# Patient Record
Sex: MALE | Race: WHITE | NOT HISPANIC OR LATINO | Employment: OTHER | URBAN - METROPOLITAN AREA
[De-identification: names, ages, dates, MRNs, and addresses within clinical notes are randomized per-mention and may not be internally consistent; named-entity substitution may affect disease eponyms.]

---

## 2023-02-10 ENCOUNTER — HOSPITAL ENCOUNTER (INPATIENT)
Facility: MEDICAL CENTER | Age: 83
LOS: 4 days | DRG: 557 | End: 2023-02-14
Attending: INTERNAL MEDICINE | Admitting: INTERNAL MEDICINE
Payer: MEDICARE

## 2023-02-10 DIAGNOSIS — I50.22 CHRONIC SYSTOLIC (CONGESTIVE) HEART FAILURE (HCC): ICD-10-CM

## 2023-02-10 DIAGNOSIS — K21.9 GASTROESOPHAGEAL REFLUX DISEASE WITHOUT ESOPHAGITIS: ICD-10-CM

## 2023-02-10 PROBLEM — R13.10 DYSPHAGIA: Status: ACTIVE | Noted: 2023-02-10

## 2023-02-10 PROBLEM — G93.40 ENCEPHALOPATHY: Status: ACTIVE | Noted: 2023-02-10

## 2023-02-10 PROBLEM — D72.829 LEUKOCYTOSIS: Status: ACTIVE | Noted: 2023-02-10

## 2023-02-10 PROBLEM — I21.4 NSTEMI (NON-ST ELEVATED MYOCARDIAL INFARCTION) (HCC): Status: ACTIVE | Noted: 2023-02-10

## 2023-02-10 PROBLEM — M62.82 RHABDOMYOLYSIS: Status: ACTIVE | Noted: 2023-02-10

## 2023-02-10 LAB
ALBUMIN SERPL BCP-MCNC: 3.9 G/DL (ref 3.2–4.9)
ALBUMIN/GLOB SERPL: 1.3 G/DL
ALP SERPL-CCNC: 152 U/L (ref 30–99)
ALT SERPL-CCNC: 95 U/L (ref 2–50)
ANION GAP SERPL CALC-SCNC: 14 MMOL/L (ref 7–16)
APPEARANCE UR: CLEAR
AST SERPL-CCNC: 227 U/L (ref 12–45)
BACTERIA #/AREA URNS HPF: ABNORMAL /HPF
BASOPHILS # BLD AUTO: 0.2 % (ref 0–1.8)
BASOPHILS # BLD: 0.03 K/UL (ref 0–0.12)
BILIRUB SERPL-MCNC: 3.1 MG/DL (ref 0.1–1.5)
BILIRUB UR QL STRIP.AUTO: NEGATIVE
BUN SERPL-MCNC: 39 MG/DL (ref 8–22)
CALCIUM ALBUM COR SERPL-MCNC: 9.8 MG/DL (ref 8.5–10.5)
CALCIUM SERPL-MCNC: 9.7 MG/DL (ref 8.5–10.5)
CHLORIDE SERPL-SCNC: 117 MMOL/L (ref 96–112)
CK SERPL-CCNC: 3736 U/L (ref 0–154)
CO2 SERPL-SCNC: 19 MMOL/L (ref 20–33)
COLOR UR: ABNORMAL
CREAT SERPL-MCNC: 0.88 MG/DL (ref 0.5–1.4)
D DIMER PPP IA.FEU-MCNC: 1.66 UG/ML (FEU) (ref 0–0.5)
EKG IMPRESSION: NORMAL
EKG IMPRESSION: NORMAL
EOSINOPHIL # BLD AUTO: 0 K/UL (ref 0–0.51)
EOSINOPHIL NFR BLD: 0 % (ref 0–6.9)
EPI CELLS #/AREA URNS HPF: NEGATIVE /HPF
ERYTHROCYTE [DISTWIDTH] IN BLOOD BY AUTOMATED COUNT: 57.6 FL (ref 35.9–50)
GFR SERPLBLD CREATININE-BSD FMLA CKD-EPI: 85 ML/MIN/1.73 M 2
GLOBULIN SER CALC-MCNC: 3 G/DL (ref 1.9–3.5)
GLUCOSE BLD STRIP.AUTO-MCNC: 157 MG/DL (ref 65–99)
GLUCOSE SERPL-MCNC: 179 MG/DL (ref 65–99)
GLUCOSE UR STRIP.AUTO-MCNC: >=1000 MG/DL
HCT VFR BLD AUTO: 36 % (ref 42–52)
HGB BLD-MCNC: 10.8 G/DL (ref 14–18)
HYALINE CASTS #/AREA URNS LPF: ABNORMAL /LPF
IMM GRANULOCYTES # BLD AUTO: 0.09 K/UL (ref 0–0.11)
IMM GRANULOCYTES NFR BLD AUTO: 0.5 % (ref 0–0.9)
INR PPP: 1.77 (ref 0.87–1.13)
KETONES UR STRIP.AUTO-MCNC: 15 MG/DL
LEUKOCYTE ESTERASE UR QL STRIP.AUTO: ABNORMAL
LYMPHOCYTES # BLD AUTO: 0.35 K/UL (ref 1–4.8)
LYMPHOCYTES NFR BLD: 2.1 % (ref 22–41)
MCH RBC QN AUTO: 23 PG (ref 27–33)
MCHC RBC AUTO-ENTMCNC: 30 G/DL (ref 33.7–35.3)
MCV RBC AUTO: 76.8 FL (ref 81.4–97.8)
MICRO URNS: ABNORMAL
MONOCYTES # BLD AUTO: 0.88 K/UL (ref 0–0.85)
MONOCYTES NFR BLD AUTO: 5.3 % (ref 0–13.4)
NEUTROPHILS # BLD AUTO: 15.15 K/UL (ref 1.82–7.42)
NEUTROPHILS NFR BLD: 91.9 % (ref 44–72)
NITRITE UR QL STRIP.AUTO: NEGATIVE
NRBC # BLD AUTO: 0 K/UL
NRBC BLD-RTO: 0 /100 WBC
PH UR STRIP.AUTO: 5 [PH] (ref 5–8)
PLATELET # BLD AUTO: 268 K/UL (ref 164–446)
PMV BLD AUTO: 9.5 FL (ref 9–12.9)
POTASSIUM SERPL-SCNC: 3.4 MMOL/L (ref 3.6–5.5)
PROCALCITONIN SERPL-MCNC: 2.59 NG/ML
PROT SERPL-MCNC: 6.9 G/DL (ref 6–8.2)
PROT UR QL STRIP: 300 MG/DL
PROTHROMBIN TIME: 20.2 SEC (ref 12–14.6)
RBC # BLD AUTO: 4.69 M/UL (ref 4.7–6.1)
RBC # URNS HPF: ABNORMAL /HPF
RBC UR QL AUTO: ABNORMAL
SODIUM SERPL-SCNC: 150 MMOL/L (ref 135–145)
SP GR UR STRIP.AUTO: 1.04
TROPONIN T SERPL-MCNC: 61 NG/L (ref 6–19)
TROPONIN T SERPL-MCNC: 71 NG/L (ref 6–19)
UROBILINOGEN UR STRIP.AUTO-MCNC: 1 MG/DL
WBC # BLD AUTO: 16.5 K/UL (ref 4.8–10.8)
WBC #/AREA URNS HPF: ABNORMAL /HPF

## 2023-02-10 PROCEDURE — 85379 FIBRIN DEGRADATION QUANT: CPT

## 2023-02-10 PROCEDURE — A9270 NON-COVERED ITEM OR SERVICE: HCPCS | Performed by: INTERNAL MEDICINE

## 2023-02-10 PROCEDURE — 82550 ASSAY OF CK (CPK): CPT

## 2023-02-10 PROCEDURE — 93010 ELECTROCARDIOGRAM REPORT: CPT | Performed by: INTERNAL MEDICINE

## 2023-02-10 PROCEDURE — 700102 HCHG RX REV CODE 250 W/ 637 OVERRIDE(OP): Performed by: INTERNAL MEDICINE

## 2023-02-10 PROCEDURE — 81001 URINALYSIS AUTO W/SCOPE: CPT

## 2023-02-10 PROCEDURE — 700105 HCHG RX REV CODE 258: Performed by: INTERNAL MEDICINE

## 2023-02-10 PROCEDURE — 85610 PROTHROMBIN TIME: CPT

## 2023-02-10 PROCEDURE — 80053 COMPREHEN METABOLIC PANEL: CPT

## 2023-02-10 PROCEDURE — 85025 COMPLETE CBC W/AUTO DIFF WBC: CPT

## 2023-02-10 PROCEDURE — 36415 COLL VENOUS BLD VENIPUNCTURE: CPT

## 2023-02-10 PROCEDURE — 770020 HCHG ROOM/CARE - TELE (206)

## 2023-02-10 PROCEDURE — 99223 1ST HOSP IP/OBS HIGH 75: CPT | Mod: AI | Performed by: INTERNAL MEDICINE

## 2023-02-10 PROCEDURE — 84484 ASSAY OF TROPONIN QUANT: CPT | Mod: 91

## 2023-02-10 PROCEDURE — 82962 GLUCOSE BLOOD TEST: CPT

## 2023-02-10 PROCEDURE — 93005 ELECTROCARDIOGRAM TRACING: CPT | Performed by: INTERNAL MEDICINE

## 2023-02-10 PROCEDURE — 700111 HCHG RX REV CODE 636 W/ 250 OVERRIDE (IP): Performed by: INTERNAL MEDICINE

## 2023-02-10 PROCEDURE — 84145 PROCALCITONIN (PCT): CPT

## 2023-02-10 RX ORDER — MORPHINE SULFATE 4 MG/ML
2 INJECTION INTRAVENOUS
Status: DISCONTINUED | OUTPATIENT
Start: 2023-02-10 | End: 2023-02-11

## 2023-02-10 RX ORDER — LORAZEPAM 2 MG/ML
0.5 INJECTION INTRAMUSCULAR EVERY 6 HOURS PRN
Status: DISCONTINUED | OUTPATIENT
Start: 2023-02-10 | End: 2023-02-14 | Stop reason: HOSPADM

## 2023-02-10 RX ORDER — SODIUM CHLORIDE 9 MG/ML
INJECTION, SOLUTION INTRAVENOUS CONTINUOUS
Status: DISCONTINUED | OUTPATIENT
Start: 2023-02-10 | End: 2023-02-10

## 2023-02-10 RX ORDER — GUAIFENESIN/DEXTROMETHORPHAN 100-10MG/5
10 SYRUP ORAL EVERY 6 HOURS PRN
Status: DISCONTINUED | OUTPATIENT
Start: 2023-02-10 | End: 2023-02-14 | Stop reason: HOSPADM

## 2023-02-10 RX ORDER — BISACODYL 10 MG
10 SUPPOSITORY, RECTAL RECTAL
Status: DISCONTINUED | OUTPATIENT
Start: 2023-02-10 | End: 2023-02-14 | Stop reason: HOSPADM

## 2023-02-10 RX ORDER — POLYETHYLENE GLYCOL 3350 17 G/17G
1 POWDER, FOR SOLUTION ORAL
Status: DISCONTINUED | OUTPATIENT
Start: 2023-02-10 | End: 2023-02-14 | Stop reason: HOSPADM

## 2023-02-10 RX ORDER — LORAZEPAM 1 MG/1
1 TABLET ORAL EVERY 6 HOURS PRN
Status: DISCONTINUED | OUTPATIENT
Start: 2023-02-10 | End: 2023-02-14 | Stop reason: HOSPADM

## 2023-02-10 RX ORDER — WARFARIN SODIUM 5 MG/1
5 TABLET ORAL
Status: COMPLETED | OUTPATIENT
Start: 2023-02-10 | End: 2023-02-10

## 2023-02-10 RX ORDER — ONDANSETRON 4 MG/1
4 TABLET, ORALLY DISINTEGRATING ORAL EVERY 4 HOURS PRN
Status: DISCONTINUED | OUTPATIENT
Start: 2023-02-10 | End: 2023-02-10

## 2023-02-10 RX ORDER — HEPARIN SODIUM 5000 [USP'U]/ML
5000 INJECTION, SOLUTION INTRAVENOUS; SUBCUTANEOUS EVERY 8 HOURS
Status: DISCONTINUED | OUTPATIENT
Start: 2023-02-10 | End: 2023-02-10

## 2023-02-10 RX ORDER — OXYCODONE HYDROCHLORIDE 5 MG/1
5 TABLET ORAL
Status: DISCONTINUED | OUTPATIENT
Start: 2023-02-10 | End: 2023-02-11

## 2023-02-10 RX ORDER — AMOXICILLIN 250 MG
2 CAPSULE ORAL 2 TIMES DAILY
Status: DISCONTINUED | OUTPATIENT
Start: 2023-02-10 | End: 2023-02-14 | Stop reason: HOSPADM

## 2023-02-10 RX ORDER — SODIUM CHLORIDE, SODIUM LACTATE, POTASSIUM CHLORIDE, CALCIUM CHLORIDE 600; 310; 30; 20 MG/100ML; MG/100ML; MG/100ML; MG/100ML
INJECTION, SOLUTION INTRAVENOUS CONTINUOUS
Status: DISCONTINUED | OUTPATIENT
Start: 2023-02-10 | End: 2023-02-14

## 2023-02-10 RX ORDER — ONDANSETRON 2 MG/ML
4 INJECTION INTRAMUSCULAR; INTRAVENOUS EVERY 4 HOURS PRN
Status: DISCONTINUED | OUTPATIENT
Start: 2023-02-10 | End: 2023-02-10

## 2023-02-10 RX ORDER — OXYCODONE HYDROCHLORIDE 5 MG/1
2.5 TABLET ORAL
Status: DISCONTINUED | OUTPATIENT
Start: 2023-02-10 | End: 2023-02-14 | Stop reason: HOSPADM

## 2023-02-10 RX ORDER — HYDRALAZINE HYDROCHLORIDE 20 MG/ML
10 INJECTION INTRAMUSCULAR; INTRAVENOUS EVERY 4 HOURS PRN
Status: DISCONTINUED | OUTPATIENT
Start: 2023-02-10 | End: 2023-02-14 | Stop reason: HOSPADM

## 2023-02-10 RX ORDER — ACETAMINOPHEN 325 MG/1
650 TABLET ORAL EVERY 6 HOURS PRN
Status: DISCONTINUED | OUTPATIENT
Start: 2023-02-10 | End: 2023-02-14 | Stop reason: HOSPADM

## 2023-02-10 RX ADMIN — CEFTRIAXONE SODIUM 2000 MG: 10 INJECTION, POWDER, FOR SOLUTION INTRAVENOUS at 18:30

## 2023-02-10 RX ADMIN — SODIUM CHLORIDE, POTASSIUM CHLORIDE, SODIUM LACTATE AND CALCIUM CHLORIDE: 600; 310; 30; 20 INJECTION, SOLUTION INTRAVENOUS at 23:46

## 2023-02-10 RX ADMIN — SODIUM CHLORIDE, POTASSIUM CHLORIDE, SODIUM LACTATE AND CALCIUM CHLORIDE: 600; 310; 30; 20 INJECTION, SOLUTION INTRAVENOUS at 17:55

## 2023-02-10 RX ADMIN — WARFARIN SODIUM 5 MG: 5 TABLET ORAL at 21:15

## 2023-02-10 ASSESSMENT — ENCOUNTER SYMPTOMS
DIARRHEA: 0
DIZZINESS: 0
PALPITATIONS: 0
ABDOMINAL PAIN: 0
FEVER: 0
NERVOUS/ANXIOUS: 0
SPEECH CHANGE: 0
BACK PAIN: 0
NAUSEA: 0
WEAKNESS: 1
COUGH: 0
HEADACHES: 0
FOCAL WEAKNESS: 0
FALLS: 1
VOMITING: 0
CHILLS: 0
CONSTIPATION: 0
MEMORY LOSS: 1
DEPRESSION: 0
DIAPHORESIS: 0
SENSORY CHANGE: 0
FLANK PAIN: 0
SHORTNESS OF BREATH: 0
MYALGIAS: 1

## 2023-02-10 ASSESSMENT — LIFESTYLE VARIABLES
TOTAL SCORE: 0
ON A TYPICAL DAY WHEN YOU DRINK ALCOHOL HOW MANY DRINKS DO YOU HAVE: 0
TOTAL SCORE: 0
HOW MANY TIMES IN THE PAST YEAR HAVE YOU HAD 5 OR MORE DRINKS IN A DAY: 0
AVERAGE NUMBER OF DAYS PER WEEK YOU HAVE A DRINK CONTAINING ALCOHOL: 0
EVER FELT BAD OR GUILTY ABOUT YOUR DRINKING: NO
EVER HAD A DRINK FIRST THING IN THE MORNING TO STEADY YOUR NERVES TO GET RID OF A HANGOVER: NO
CONSUMPTION TOTAL: NEGATIVE
HAVE PEOPLE ANNOYED YOU BY CRITICIZING YOUR DRINKING: NO
TOTAL SCORE: 0
HAVE YOU EVER FELT YOU SHOULD CUT DOWN ON YOUR DRINKING: NO
ALCOHOL_USE: NO

## 2023-02-10 ASSESSMENT — COGNITIVE AND FUNCTIONAL STATUS - GENERAL
MOBILITY SCORE: 12
TURNING FROM BACK TO SIDE WHILE IN FLAT BAD: A LOT
STANDING UP FROM CHAIR USING ARMS: A LOT
SUGGESTED CMS G CODE MODIFIER MOBILITY: CL
TOILETING: A LOT
SUGGESTED CMS G CODE MODIFIER DAILY ACTIVITY: CL
PERSONAL GROOMING: A LOT
MOVING FROM LYING ON BACK TO SITTING ON SIDE OF FLAT BED: A LOT
DRESSING REGULAR LOWER BODY CLOTHING: A LOT
MOVING TO AND FROM BED TO CHAIR: A LOT
DRESSING REGULAR UPPER BODY CLOTHING: A LOT
DAILY ACTIVITIY SCORE: 12
EATING MEALS: A LOT
WALKING IN HOSPITAL ROOM: A LOT
HELP NEEDED FOR BATHING: A LOT
CLIMB 3 TO 5 STEPS WITH RAILING: A LOT

## 2023-02-10 ASSESSMENT — PAIN DESCRIPTION - PAIN TYPE: TYPE: ACUTE PAIN

## 2023-02-10 ASSESSMENT — PATIENT HEALTH QUESTIONNAIRE - PHQ9
2. FEELING DOWN, DEPRESSED, IRRITABLE, OR HOPELESS: NOT AT ALL
1. LITTLE INTEREST OR PLEASURE IN DOING THINGS: NOT AT ALL
SUM OF ALL RESPONSES TO PHQ9 QUESTIONS 1 AND 2: 0

## 2023-02-10 NOTE — PROGRESS NOTES
Triage Note:    82 male evaluated for encephalopathy, patient was found down.  He is has a history of atrial fibrillation on chronic anticoagulation with Coumadin.  INR 1.9.  He is transferred for rhabdomyolysis, NSTEMI with troponin of 316.  Patient denies chest pain.    Patient is transferred for NSTEMI and possible cardiac evaluation.

## 2023-02-11 ENCOUNTER — APPOINTMENT (OUTPATIENT)
Dept: RADIOLOGY | Facility: MEDICAL CENTER | Age: 83
DRG: 557 | End: 2023-02-11
Attending: INTERNAL MEDICINE
Payer: MEDICARE

## 2023-02-11 ENCOUNTER — APPOINTMENT (OUTPATIENT)
Dept: CARDIOLOGY | Facility: MEDICAL CENTER | Age: 83
DRG: 557 | End: 2023-02-11
Attending: INTERNAL MEDICINE
Payer: MEDICARE

## 2023-02-11 PROBLEM — I21.4 NSTEMI (NON-ST ELEVATED MYOCARDIAL INFARCTION) (HCC): Status: RESOLVED | Noted: 2023-02-10 | Resolved: 2023-02-11

## 2023-02-11 LAB
ALBUMIN SERPL BCP-MCNC: 3.7 G/DL (ref 3.2–4.9)
ANION GAP SERPL CALC-SCNC: 14 MMOL/L (ref 7–16)
ANISOCYTOSIS BLD QL SMEAR: ABNORMAL
BASOPHILS # BLD AUTO: 0.1 % (ref 0–1.8)
BASOPHILS # BLD: 0.02 K/UL (ref 0–0.12)
BUN SERPL-MCNC: 36 MG/DL (ref 8–22)
BUN SERPL-MCNC: 38 MG/DL (ref 8–22)
CALCIUM ALBUM COR SERPL-MCNC: 9.9 MG/DL (ref 8.5–10.5)
CALCIUM SERPL-MCNC: 9.4 MG/DL (ref 8.5–10.5)
CALCIUM SERPL-MCNC: 9.7 MG/DL (ref 8.5–10.5)
CHLORIDE SERPL-SCNC: 113 MMOL/L (ref 96–112)
CHLORIDE SERPL-SCNC: 118 MMOL/L (ref 96–112)
CHOLEST SERPL-MCNC: 151 MG/DL (ref 100–199)
CK SERPL-CCNC: 2243 U/L (ref 0–154)
CO2 SERPL-SCNC: 18 MMOL/L (ref 20–33)
CO2 SERPL-SCNC: 21 MMOL/L (ref 20–33)
COMMENT 1642: NORMAL
CREAT SERPL-MCNC: 0.72 MG/DL (ref 0.5–1.4)
CREAT SERPL-MCNC: 0.74 MG/DL (ref 0.5–1.4)
EKG IMPRESSION: NORMAL
EOSINOPHIL # BLD AUTO: 0 K/UL (ref 0–0.51)
EOSINOPHIL NFR BLD: 0 % (ref 0–6.9)
ERYTHROCYTE [DISTWIDTH] IN BLOOD BY AUTOMATED COUNT: 59.2 FL (ref 35.9–50)
GFR SERPLBLD CREATININE-BSD FMLA CKD-EPI: 89 ML/MIN/1.73 M 2
GFR SERPLBLD CREATININE-BSD FMLA CKD-EPI: 90 ML/MIN/1.73 M 2
GLUCOSE SERPL-MCNC: 152 MG/DL (ref 65–99)
GLUCOSE SERPL-MCNC: 221 MG/DL (ref 65–99)
HCT VFR BLD AUTO: 36.8 % (ref 42–52)
HDLC SERPL-MCNC: 58 MG/DL
HGB BLD-MCNC: 10.8 G/DL (ref 14–18)
HYPOCHROMIA BLD QL SMEAR: ABNORMAL
IMM GRANULOCYTES # BLD AUTO: 0.15 K/UL (ref 0–0.11)
IMM GRANULOCYTES NFR BLD AUTO: 0.9 % (ref 0–0.9)
INR PPP: 1.8 (ref 0.87–1.13)
LDLC SERPL CALC-MCNC: 62 MG/DL
LV EJECT FRACT  99904: 45
LV EJECT FRACT MOD 2C 99903: 49.69
LV EJECT FRACT MOD 4C 99902: 44.88
LV EJECT FRACT MOD BP 99901: 46.25
LYMPHOCYTES # BLD AUTO: 0.38 K/UL (ref 1–4.8)
LYMPHOCYTES NFR BLD: 2.2 % (ref 22–41)
MCH RBC QN AUTO: 23 PG (ref 27–33)
MCHC RBC AUTO-ENTMCNC: 29.3 G/DL (ref 33.7–35.3)
MCV RBC AUTO: 78.5 FL (ref 81.4–97.8)
MICROCYTES BLD QL SMEAR: ABNORMAL
MONOCYTES # BLD AUTO: 0.9 K/UL (ref 0–0.85)
MONOCYTES NFR BLD AUTO: 5.2 % (ref 0–13.4)
MORPHOLOGY BLD-IMP: NORMAL
NEUTROPHILS # BLD AUTO: 15.79 K/UL (ref 1.82–7.42)
NEUTROPHILS NFR BLD: 91.6 % (ref 44–72)
NRBC # BLD AUTO: 0 K/UL
NRBC BLD-RTO: 0 /100 WBC
OVALOCYTES BLD QL SMEAR: NORMAL
PHOSPHATE SERPL-MCNC: 2.8 MG/DL (ref 2.5–4.5)
PLATELET # BLD AUTO: 279 K/UL (ref 164–446)
PLATELET BLD QL SMEAR: NORMAL
PMV BLD AUTO: 9.8 FL (ref 9–12.9)
POIKILOCYTOSIS BLD QL SMEAR: NORMAL
POTASSIUM SERPL-SCNC: 3.4 MMOL/L (ref 3.6–5.5)
POTASSIUM SERPL-SCNC: 3.5 MMOL/L (ref 3.6–5.5)
PROTHROMBIN TIME: 20.4 SEC (ref 12–14.6)
RBC # BLD AUTO: 4.69 M/UL (ref 4.7–6.1)
RBC BLD AUTO: PRESENT
SODIUM SERPL-SCNC: 146 MMOL/L (ref 135–145)
SODIUM SERPL-SCNC: 150 MMOL/L (ref 135–145)
TRIGL SERPL-MCNC: 155 MG/DL (ref 0–149)
WBC # BLD AUTO: 17.2 K/UL (ref 4.8–10.8)

## 2023-02-11 PROCEDURE — 80061 LIPID PANEL: CPT

## 2023-02-11 PROCEDURE — A9270 NON-COVERED ITEM OR SERVICE: HCPCS | Performed by: INTERNAL MEDICINE

## 2023-02-11 PROCEDURE — 700105 HCHG RX REV CODE 258: Performed by: INTERNAL MEDICINE

## 2023-02-11 PROCEDURE — 93306 TTE W/DOPPLER COMPLETE: CPT | Mod: 26 | Performed by: INTERNAL MEDICINE

## 2023-02-11 PROCEDURE — 85610 PROTHROMBIN TIME: CPT

## 2023-02-11 PROCEDURE — 36415 COLL VENOUS BLD VENIPUNCTURE: CPT

## 2023-02-11 PROCEDURE — 700102 HCHG RX REV CODE 250 W/ 637 OVERRIDE(OP): Performed by: INTERNAL MEDICINE

## 2023-02-11 PROCEDURE — 770020 HCHG ROOM/CARE - TELE (206)

## 2023-02-11 PROCEDURE — 93306 TTE W/DOPPLER COMPLETE: CPT

## 2023-02-11 PROCEDURE — 80048 BASIC METABOLIC PNL TOTAL CA: CPT

## 2023-02-11 PROCEDURE — 80069 RENAL FUNCTION PANEL: CPT

## 2023-02-11 PROCEDURE — 85025 COMPLETE CBC W/AUTO DIFF WBC: CPT

## 2023-02-11 PROCEDURE — 92610 EVALUATE SWALLOWING FUNCTION: CPT

## 2023-02-11 PROCEDURE — 99232 SBSQ HOSP IP/OBS MODERATE 35: CPT | Performed by: INTERNAL MEDICINE

## 2023-02-11 PROCEDURE — 70450 CT HEAD/BRAIN W/O DYE: CPT

## 2023-02-11 PROCEDURE — 82550 ASSAY OF CK (CPK): CPT

## 2023-02-11 PROCEDURE — 93010 ELECTROCARDIOGRAM REPORT: CPT | Performed by: INTERNAL MEDICINE

## 2023-02-11 RX ORDER — TAMSULOSIN HYDROCHLORIDE 0.4 MG/1
0.4 CAPSULE ORAL DAILY
COMMUNITY

## 2023-02-11 RX ORDER — SIMVASTATIN 20 MG
20 TABLET ORAL EVERY EVENING
Status: DISCONTINUED | OUTPATIENT
Start: 2023-02-11 | End: 2023-02-14 | Stop reason: HOSPADM

## 2023-02-11 RX ORDER — SPIRONOLACTONE 25 MG/1
25 TABLET ORAL DAILY
COMMUNITY

## 2023-02-11 RX ORDER — SPIRONOLACTONE 25 MG/1
25 TABLET ORAL DAILY
Status: DISCONTINUED | OUTPATIENT
Start: 2023-02-12 | End: 2023-02-14 | Stop reason: HOSPADM

## 2023-02-11 RX ORDER — GLIPIZIDE 5 MG/1
5 TABLET, FILM COATED, EXTENDED RELEASE ORAL DAILY
COMMUNITY

## 2023-02-11 RX ORDER — LISINOPRIL 20 MG/1
40 TABLET ORAL DAILY
Status: DISCONTINUED | OUTPATIENT
Start: 2023-02-12 | End: 2023-02-14 | Stop reason: HOSPADM

## 2023-02-11 RX ORDER — LISINOPRIL 40 MG/1
40 TABLET ORAL DAILY
Status: ON HOLD | COMMUNITY
Start: 2022-02-11 | End: 2023-02-11

## 2023-02-11 RX ORDER — TAMSULOSIN HYDROCHLORIDE 0.4 MG/1
0.4 CAPSULE ORAL DAILY
Status: DISCONTINUED | OUTPATIENT
Start: 2023-02-11 | End: 2023-02-14 | Stop reason: HOSPADM

## 2023-02-11 RX ORDER — FUROSEMIDE 20 MG/1
20 TABLET ORAL DAILY
Status: DISCONTINUED | OUTPATIENT
Start: 2023-02-12 | End: 2023-02-14 | Stop reason: HOSPADM

## 2023-02-11 RX ORDER — SIMVASTATIN 20 MG
20 TABLET ORAL
Status: ON HOLD | COMMUNITY
Start: 2022-02-11 | End: 2023-02-11

## 2023-02-11 RX ORDER — FUROSEMIDE 20 MG/1
20 TABLET ORAL DAILY
COMMUNITY

## 2023-02-11 RX ORDER — DAPAGLIFLOZIN 10 MG/1
10 TABLET, FILM COATED ORAL DAILY
Status: DISCONTINUED | OUTPATIENT
Start: 2023-02-11 | End: 2023-02-14 | Stop reason: HOSPADM

## 2023-02-11 RX ORDER — ATENOLOL 50 MG/1
50 TABLET ORAL DAILY
Status: DISCONTINUED | OUTPATIENT
Start: 2023-02-11 | End: 2023-02-13

## 2023-02-11 RX ORDER — WARFARIN SODIUM 2.5 MG/1
2.5 TABLET ORAL
Status: DISCONTINUED | OUTPATIENT
Start: 2023-02-11 | End: 2023-02-13

## 2023-02-11 RX ORDER — ATENOLOL 50 MG/1
50 TABLET ORAL DAILY
Status: ON HOLD | COMMUNITY
End: 2023-02-11

## 2023-02-11 RX ORDER — WARFARIN SODIUM 1 MG/1
1 TABLET ORAL DAILY
COMMUNITY

## 2023-02-11 RX ORDER — WARFARIN SODIUM 5 MG/1
5 TABLET ORAL
Status: DISCONTINUED | OUTPATIENT
Start: 2023-02-14 | End: 2023-02-13

## 2023-02-11 RX ORDER — DAPAGLIFLOZIN 10 MG/1
10 TABLET, FILM COATED ORAL DAILY
COMMUNITY
Start: 2023-01-16

## 2023-02-11 RX ORDER — OMEPRAZOLE 20 MG/1
20 CAPSULE, DELAYED RELEASE ORAL DAILY
Status: DISCONTINUED | OUTPATIENT
Start: 2023-02-11 | End: 2023-02-14 | Stop reason: HOSPADM

## 2023-02-11 RX ADMIN — OMEPRAZOLE 20 MG: 20 CAPSULE, DELAYED RELEASE ORAL at 15:35

## 2023-02-11 RX ADMIN — ATENOLOL 50 MG: 50 TABLET ORAL at 15:35

## 2023-02-11 RX ADMIN — SODIUM CHLORIDE, POTASSIUM CHLORIDE, SODIUM LACTATE AND CALCIUM CHLORIDE: 600; 310; 30; 20 INJECTION, SOLUTION INTRAVENOUS at 05:17

## 2023-02-11 RX ADMIN — SENNOSIDES AND DOCUSATE SODIUM 2 TABLET: 50; 8.6 TABLET ORAL at 05:06

## 2023-02-11 RX ADMIN — DAPAGLIFLOZIN 10 MG: 10 TABLET, FILM COATED ORAL at 15:35

## 2023-02-11 RX ADMIN — TAMSULOSIN HYDROCHLORIDE 0.4 MG: 0.4 CAPSULE ORAL at 15:35

## 2023-02-11 RX ADMIN — SIMVASTATIN 20 MG: 20 TABLET, FILM COATED ORAL at 17:39

## 2023-02-11 RX ADMIN — SODIUM CHLORIDE, POTASSIUM CHLORIDE, SODIUM LACTATE AND CALCIUM CHLORIDE: 600; 310; 30; 20 INJECTION, SOLUTION INTRAVENOUS at 16:27

## 2023-02-11 RX ADMIN — ASPIRIN 81 MG: 81 TABLET, COATED ORAL at 05:06

## 2023-02-11 ASSESSMENT — COPD QUESTIONNAIRES
HAVE YOU SMOKED AT LEAST 100 CIGARETTES IN YOUR ENTIRE LIFE: NO/DON'T KNOW
DO YOU EVER COUGH UP ANY MUCUS OR PHLEGM?: NO/ONLY WITH OCCASIONAL COLDS OR INFECTIONS
DURING THE PAST 4 WEEKS HOW MUCH DID YOU FEEL SHORT OF BREATH: NONE/LITTLE OF THE TIME
COPD SCREENING SCORE: 2

## 2023-02-11 ASSESSMENT — CHA2DS2 SCORE
CHF OR LEFT VENTRICULAR DYSFUNCTION: NO
SEX: MALE
CHA2DS2 VASC SCORE: 2
PRIOR STROKE OR TIA OR THROMBOEMBOLISM: NO
AGE 75 OR GREATER: YES
VASCULAR DISEASE: NO
DIABETES: NO
HYPERTENSION: NO
AGE 65 TO 74: NO

## 2023-02-11 ASSESSMENT — FIBROSIS 4 INDEX: FIB4 SCORE: 6.93

## 2023-02-11 NOTE — CARE PLAN
The patient is Stable - Low risk of patient condition declining or worsening    Shift Goals  Clinical Goals: Remain free from chest pain, remain free from falls, remain free from aspirations, remain oriented x 4  Patient Goals: Eat dinner  Family Goals: ABE      Problem: Hemodynamics  Goal: Patient's hemodynamics, fluid balance and neurologic status will be stable or improve  Outcome: Progressing     Problem: Fluid Volume  Goal: Fluid volume balance will be maintained  Outcome: Progressing     Problem: Dysphagia  Goal: Dysphagia will improve  Outcome: Progressing     Problem: Urinary Elimination  Goal: Establish and maintain regular urinary output  Outcome: Progressing       Progress made toward(s) clinical / shift goals:  Pt denies chest pain, EKGs unremarkable from prior, VSS. Pt remains oriented x 3-4, occasionally confused about location. Possible hallucinations noted. Occasionally attempts to get out of bed. Appropriate fall precautions in place, education provided and reinforced. Med administered crushed with thick liquid, pt tolerated well with no coughing. Speech eval pending. HOB elevated, education provided.     Patient is not progressing towards the following goals: N/A

## 2023-02-11 NOTE — ASSESSMENT & PLAN NOTE
"Patient will be admitted to the telemetry telemetry inpatient unit  CPK of 3900  Continue IV fluid hydration  Patient was found down  He does report hitting his head and loss of consciousness, no focal deficits  -Continue neurochecks  Consider further neurologic work-up if worsening neurologic symptoms  PT/OT/SLP evaluation  Monitor renal function\"    Gentle IV hydration  1-2 glasses free water per meal  Restart diuretics and spironolactone  Trend Na.  Na normalized. Advised to continue drinking pure water  "

## 2023-02-11 NOTE — PROGRESS NOTES
Inpatient Anticoagulation Service Note for 2/10/2023      Reason for Anticoagulation: Atrial Fibrillation           Hemoglobin Value: (!) 10.8  Hematocrit Value: (!) 36  Lab Platelet Value: 268  Target INR: 2.0 to 3.0    INR from last 7 days       Date/Time INR Value    02/10/23 1759 1.77          Dose from last 7 days       Date/Time Dose (mg)    02/10/23 2003 5          Significant Interactions: Antiplatelet Medications, Antibiotics  Bridge Therapy: No     Reversal Agent Administered: Not Applicable  Comments: Patient presents as a transfer after being found down with acute rhabdomyolysis and altered mental status. He is unable to confirm his dosing history. Chart review from [facility] indicates that he takes 5mg twice weekly and 2.5mg the rest of the week. INR is subtherapeutic, being treated for afib. Will give 5mg dose tonight and check INR in the morning. No bleeding noted.    Plan:  5mg   Education Material Provided?: No    Pharmacist suggested discharge dosinmg Tuesday and Thursday with 2.5mg the rest of the week. INR within 72 hours of discharge.      Tru Trejo, PharmD

## 2023-02-11 NOTE — PROGRESS NOTES
"Hospital Medicine Daily Progress Note    Date of Service  2/11/2023    Chief Complaint  Perry Cross is a 82 y.o. male admitted 2/10/2023 with No chief complaint on file.        Hospital Course  No notes on file  Perry Cross is a 82 y.o. male who presented 2/10/2023 with history of atrial fibrillation on chronic anticoagulation with Coumadin presents after being found down at his home.  Patient is normally functional and independent.  He lives alone.  Patient was found to have greater than 3000 CPK with elevated troponin at outlying facility PE.  EKG with atrial fibrillation, rate controlled.  INR of 1.9.  Patient does report hitting his head, however, denies  loss of consciousness.  Urinalysis is pending.     On evaluation, patient was unable to pass a swallow evaluation.  He reports generalized body aches.  He is quite pleasant, and reports falling down the stairs.  He is unclear how the ambulance was contacted.  But doesn't believe he was on the ground for long.  Pt is alert and oriented x 3 on my exam.  However, as per staff, intermittent confusion.  Was unable to recognize his family.  [facility].\"    Dr. Ricketts 2/10/2023    Interval Problem Update  2/11. He is back to his baseline though he has poor insight and memory, he is in a good mood. Family at [facility], I spent time answering their questions reviewing results, medications and tests. Patient also on a dysphagia friendly diet  2/11/2023 Time spent [id number]    I have discussed this patient's plan of care and discharge plan at IDT rounds today with Case Management, Nursing, Nursing leadership, and other members of the IDT team.    Consultants/Specialty      Code Status  Full Code    Disposition  Patient is not medically cleared for discharge.   Anticipate discharge to  TBD .  I have placed the appropriate orders for post-discharge needs.    Review of Systems  Review of Systems   Unable to perform ROS: " Mental acuity      Physical Exam  Temp:  [36.6 °C (97.9 °F)-37.1 °C (98.8 °F)] 36.6 °C (97.9 °F)  Pulse:  [61-88] 74  Resp:  [16-18] 17  BP: (135-165)/(66-96) 135/80  SpO2:  [92 %-96 %] 95 %    Physical Exam  Vitals and nursing note reviewed.   Constitutional:       Comments: Elderly, thin   HENT:      Head: Normocephalic and atraumatic.      Right Ear: External ear normal.      Left Ear: External ear normal.      Nose: Nose normal.      Mouth/Throat:      Mouth: Mucous membranes are moist.   Eyes:      General: No scleral icterus.     Conjunctiva/sclera: Conjunctivae normal.   Cardiovascular:      Rate and Rhythm: Normal rate and regular rhythm.      Heart sounds: No murmur heard.    No friction rub. No gallop.   Pulmonary:      Effort: Pulmonary effort is normal.      Breath sounds: Normal breath sounds.   Abdominal:      General: Abdomen is flat. Bowel sounds are normal. There is no distension.      Palpations: Abdomen is soft.      Tenderness: There is no abdominal tenderness. There is no guarding.   Musculoskeletal:         General: Normal range of motion.      Cervical back: Normal range of motion and neck supple.   Skin:     General: Skin is warm.   Neurological:      Mental Status: He is alert and oriented to person, place, and time. Mental status is at baseline.      Comments: Memorry issues  Cognitive deficits   Psychiatric:         Mood and Affect: Mood normal.         Behavior: Behavior normal.         Thought Content: Thought content normal.         Judgment: Judgment normal.       Fluids    Intake/Output Summary (Last 24 hours) at 2/11/2023 0808  Last data filed at 2/11/2023 0500  Gross per 24 hour   Intake 1963.58 ml   Output 1000 ml   Net 963.58 ml       Laboratory  Recent Labs     02/10/23  1438 02/11/23  0514   WBC 16.5* 17.2*   RBC 4.69* 4.69*   HEMOGLOBIN 10.8* 10.8*   HEMATOCRIT 36.0* 36.8*   MCV 76.8* 78.5*   MCH 23.0* 23.0*   MCHC 30.0* 29.3*   RDW 57.6* 59.2*   PLATELETCT 268 279   MPV 9.5  "9.8     Recent Labs     02/10/23  1438 02/11/23  0514   SODIUM 150* 150*   POTASSIUM 3.4* 3.5*   CHLORIDE 117* 118*   CO2 19* 18*   GLUCOSE 179* 152*   BUN 39* 38*   CREATININE 0.88 0.72   CALCIUM 9.7 9.4     Recent Labs     02/10/23  1759 02/11/23  0514   INR 1.77* 1.80*         Recent Labs     02/11/23  0514   TRIGLYCERIDE 155*   HDL 58   LDL 62       Imaging  EC-ECHOCARDIOGRAM COMPLETE W/O CONT   Final Result      NM-CARDIAC STRESS TEST    (Results Pending)        Assessment/Plan  * Rhabdomyolysis, hypernatremia/dehydration, UTI, elevated troponin, h/o mild CHF and mod pul HTN  Assessment & Plan  Patient will be admitted to the telemetry telemetry inpatient unit  CPK of 3900  Continue IV fluid hydration  Patient was found down  He does report hitting his head and loss of consciousness, no focal deficits  -Continue neurochecks  Consider further neurologic work-up if worsening neurologic symptoms  PT/OT/SLP evaluation  Monitor renal function\"    Gentle IV hydration  Close to euvolemia, will need diuretics at some point  Note sodium levels, allow free    Encephalopathy  Assessment & Plan  Patient with intermittent confusion, lives alone and independent prior to this episode  Continue neurochecks  Geriatrics consult  Rule out infection  UA pending\"    Seems to be back to baseline  U/A show evidence of UTI thus Abx  Reviewed head CT results with family-normal    Leukocytosis  Assessment & Plan  Leukocytosis, UA pending  We will empirically cover with Rocephin  Follow-up procalcitonin\"    U/A some evidence if UTI  Continue antibiotics    Dysphagia  Assessment & Plan  Failed swallow evaluation  N.p.o. for SLP evaluation  Continue IV fluids\"    Now on pureed liquids but if tolerating do clear liquid         VTE prophylaxis: therapeutic anticoagulation with warfarin    I have performed a physical exam and reviewed and updated ROS and Plan today (2/11/2023). In review of yesterday's note (2/10/2023), there are no changes " except as documented above.

## 2023-02-11 NOTE — PROGRESS NOTES
Inpatient Anticoagulation Service Note for 2/11/2023      Reason for Anticoagulation: Atrial Fibrillation   [id number] VASc Score: 2  HAS-BLED Score: 2    Hemoglobin Value: (!) 10.8  Hematocrit Value: (!) 36.8  Lab Platelet Value: 279  Target INR: 2.0 to 3.0    INR from last 7 days       Date/Time INR Value    02/11/23 0514 1.8    02/10/23 1759 1.77          Dose from last 7 days       Date/Time Dose (mg)    02/11/23 1450 2.5    02/10/23 2003 5          Significant Interactions: Not Applicable  Bridge Therapy: No   Reversal Agent Administered: Not Applicable  Comments: Continue with home dosing and INR trend.  Education Material Provided?: No    Pharmacist suggested discharge dosing: warfarin home dosing of 5 mg on Tu/Th and 2.5 mg ROW.  INR check within 48 hours of discharge.     Mirtha Jacobo, PharmD  [id number]

## 2023-02-11 NOTE — THERAPY
Speech Language Pathology   Initial Assessment     Patient Name: Perry Cross  AGE:  82 y.o., SEX:  male  Medical Record #: [id number]  Today's Date: 2/11/2023     Precautions  Precautions: Swallow Precautions ( See Comments)    HPI: Pt is a very pleasant 82 y.o. male transferred from outside facility ([facility]) for rhabdomyolysis, NSTEMI with troponin of 316. Per notes, he was found down at his time and reports hitting his head. CT head and cervical from outlying facility w/o acute findings. UA pending.     Chest x-ray on 2/10 reports clear of infiltrates or nodules.     PMHx: none on file. Pt reports to this SLP he is pre-diabetic.     Level of Consciousness: Awake  Affect/Behavior: Calm, Cooperative  Follows Directives: Yes  Orientation: Oriented x 4  Hearing: Functional hearing  Vision: Functional vision    Prior Living Situation & Level of Function: Pt reports he lives in [place] and has good neighbors/friends nearby.     Oral Mechanism Evaluation  Facial Symmetry: Equal  Facial Sensation: Equal  Labial Observations: WFL  Lingual Observations: Midline  Dentition: Fair  Comments:    Voice  Quality: WFL  Resonance: WFL  Intensity: Soft  Cough: WFL  Comments:    Current Method of Nutrition   NPO until cleared by speech pathology    Assessment  Positioning: Bed - Chair Position  Bolus Administration: SLP  Oxygen Requirements: Room Air  Factor(s) Affecting Performance: None    Swallowing Trials  Ice: WFL  Thin Liquid (TN0): Impaired  Mildly Thick Liquid (MT2): WFL  Liquidised (LQ3): WFL  Pureed (PU4): WFL    Comments: Pt seen this date for clinical swallow evaluation. Oral mechanism exam completed. AMRs and SMRs were uncoordinated and mildly imprecise. Speech was mildly dysarthric across evaluation, and precise articulation appeared laborious. Pt acknowledges speech difficulty and reports this is not baseline. Trials of thins resulted in immediate and delayed coughing, concerning for penetration/aspiration. Ice,  MTL, liquidized, and purees resulted in slightly delayed initiation of swallow trigger, but functional and no overt s/sx concerning for aspiration. Pt appeared delayed with bringing spoon to mouth during PO trials. Notes indicate CT head from outlying facility w/o acute findings, however given dysarthria and coordination difficulties, pt could benefit from additional imaging.      Clinical Impressions: Pt presenting with mild oral dysphagia with suspicion for pharyngeal dysphagia, as evidenced by immediate and delayed coughing with thin liquids. Suspect acute, query if related to encephalopathy. Swallow appears functional for PU4/MT2 diet with direct supervision and adherence to posted swallow precautions.      Recommendations  1.  PU4/MT2, direct supervision.   2.  Instrumentation: Instrumental swallow study pending clinical progress  3.  Swallowing Instructions & Precautions:   Supervision: Direct supervision during meals  Positioning: Fully upright and midline during oral intake  Medication: Whole with puree, Crush with applesauce or puree, as appropriate, As tolerated  Strategies: Small bites/sips  Oral Care: Q8h  4.  SLP to follow     Plan  Recommend Speech Therapy 3 times per week until therapy goals are met for the following treatments:  Dysphagia Training.    Discharge Recommendations: Other (Dependent on status at time of d/c)    Objective   02/11/23 0925   Precautions   Precautions Swallow Precautions ( See Comments)   Pain 0 - 10 Group   Therapist Pain Assessment Post Activity Pain Same as Prior to Activity;Nurse Notified;0   Prior Living Situation   Housing / Facility 2 Story House   Lives with - Patient's Self Care Capacity Alone and Able to Care For Self   Oral Motor Eval    Is Patient Able to Complete Oral Motor Eval Yes, Within Normal Limits   Laryngeal Function   Voice Quality Within Functional Limits   Volutional Cough Within Functional Limits   Excursion Upon Swallow Complete   Oral Food  "Presentation   Ice Chips Within Functional Limits   Single Swallow Mildly Thick (2) - (Nectar Thick)  Within Functional Limits   Serial Swallow Mildly Thick (2) - (Nectar Thick) Within Functional Limits   Single Swallow Thin (0) Moderate   Liquidised (3) Within Functional Limits   Pureed (4) Within Functional Limits   Self Feeding Independent   Tracheostomy   Tracheostomy  No   Dysphagia Strategies / Recommendations   Strategies / Interventions Recommended (Yes / No) Yes   Compensatory Strategies Direct Supervision During Meals;Head of Bed 90 Degrees During Eating / Drinking;Single Sips / Bites   Diet / Liquid Recommendation Puree (4);Mildly Thick (2) - (Nectar Thick)   Medication Administration  Float Whole with Puree;Crush all Medications in Puree   Therapy Interventions Dysphagia Therapy By Speech Language Pathologist   Follow Up SLP Evaluation SLP to follow   Patient / Family Goals   Patient / Family Goal #1 \"Goes down smooth\"   Short Term Goals   Short Term Goal # 1 Pt will consume PU4/MT2 with direct supervision from nursing staff and adherence to posted swallow precautions with no overt s/sx concerning for aspiration.       "

## 2023-02-11 NOTE — ASSESSMENT & PLAN NOTE
"Failed swallow evaluation  N.p.o. for SLP evaluation  Continue IV fluids\"    Now on pureed liquids but if tolerating do clear liquid  "

## 2023-02-11 NOTE — ASSESSMENT & PLAN NOTE
"Troponin elevated at 71  EKG with atrial fibrillation, chronic  Rate controlled  On chronic anticoagulation with Coumadin, INR of 1.9  We will continue Coumadin per pharmacy  Follow-up troponin  Stress test in a.m.  Cardiology evaluation as needed  No known history of coronary artery disease\"    Likely type 2 also due to rhabdomyolysis itself  "

## 2023-02-11 NOTE — ASSESSMENT & PLAN NOTE
"Patient with intermittent confusion, lives alone and independent prior to this episode  Continue neurochecks  Geriatrics consult  Rule out infection  UA pending\"    Seems to be back to baseline  U/A show evidence of UTI thus Abx  Reviewed head CT results with family-normal  "

## 2023-02-11 NOTE — H&P
Hospital Medicine History & Physical Note    Date of Service  2/10/2023    Primary Care Physician  Tk Adrian M.D.    Consultants  none    Specialist Names: na    Code Status  Full Code    Chief Complaint  No chief complaint on file.      History of Presenting Illness  Perry Cross is a 82 y.o. male who presented 2/10/2023 with history of atrial fibrillation on chronic anticoagulation with Coumadin presents after being found down at his home.  Patient is normally functional and independent.  He lives alone.  Patient was found to have greater than 3000 CPK with elevated troponin at outlying facility PE.  EKG with atrial fibrillation, rate controlled.  INR of 1.9.  Patient does report hitting his head, however, denies  loss of consciousness.  Urinalysis is pending.    On evaluation, patient was unable to pass a swallow evaluation.  He reports generalized body aches.  He is quite pleasant, and reports falling down the stairs.  He is unclear how the ambulance was contacted.  But doesn't believe he was on the ground for long.  Pt is alert and oriented x 3 on my exam.  However, as per staff, intermittent confusion.  Was unable to recognize his family.  CT head and cervical from outlying facility w/o acute findings.    No focal weakness.    I discussed the plan of care with patient and bedside RN.    Review of Systems  Review of Systems   Constitutional:  Negative for chills, diaphoresis, fever and malaise/fatigue.   HENT:  Negative for congestion and hearing loss.    Respiratory:  Negative for cough and shortness of breath.    Cardiovascular:  Negative for chest pain, palpitations and leg swelling.   Gastrointestinal:  Negative for abdominal pain, constipation, diarrhea, nausea and vomiting.   Genitourinary:  Negative for dysuria, flank pain and urgency.   Musculoskeletal:  Positive for falls and myalgias. Negative for back pain and joint pain.   Neurological:  Positive for weakness. Negative for dizziness,  sensory change, speech change, focal weakness and headaches.   Psychiatric/Behavioral:  Positive for memory loss. Negative for depression. The patient is not nervous/anxious.      Past Medical History   has no past medical history on file.    Surgical History   has no past surgical history on file.     Family History  family history is not on file.   Family history reviewed with patient. There is no family history that is pertinent to the chief complaint.     Social History   reports that he has never smoked. He has never used smokeless tobacco. He reports that he does not drink alcohol and does not use drugs.    Allergies  No Known Allergies    Medications  None       Physical Exam  Temp:  [36.7 °C (98 °F)-37.1 °C (98.8 °F)] 37.1 °C (98.8 °F)  Pulse:  [61-88] 64  Resp:  [16-18] 18  BP: (137-165)/(66-96) 137/81  SpO2:  [92 %-96 %] 96 %  Blood Pressure : (!) 159/88   Temperature: 36.7 °C (98.1 °F)   Pulse: 61   Respiration: 16   Pulse Oximetry: 92 %       Physical Exam  Vitals and nursing note reviewed.   Constitutional:       General: He is not in acute distress.     Appearance: He is not ill-appearing, toxic-appearing or diaphoretic.   HENT:      Head: Normocephalic and atraumatic.      Nose: Nose normal.      Mouth/Throat:      Mouth: Mucous membranes are moist.   Eyes:      General:         Right eye: No discharge.         Left eye: No discharge.      Extraocular Movements: Extraocular movements intact.      Pupils: Pupils are equal, round, and reactive to light.   Neck:      Thyroid: No thyromegaly.      Vascular: No JVD.   Cardiovascular:      Rate and Rhythm: Normal rate.      Heart sounds: No murmur heard.  Pulmonary:      Effort: Pulmonary effort is normal. No respiratory distress.      Breath sounds: Normal breath sounds. No wheezing.   Abdominal:      General: Bowel sounds are normal. There is no distension.      Palpations: Abdomen is soft.      Tenderness: There is no abdominal tenderness.    Musculoskeletal:         General: No swelling or tenderness.      Cervical back: Neck supple.      Comments: Mild LE edema   Skin:     General: Skin is warm and dry.      Coloration: Skin is not pale.      Findings: Bruising present. No erythema or rash.   Neurological:      Mental Status: He is alert and oriented to person, place, and time.      Cranial Nerves: No cranial nerve deficit.      Sensory: No sensory deficit.      Motor: Weakness present.      Coordination: Coordination normal.   Psychiatric:         Behavior: Behavior normal.         Thought Content: Thought content normal.       Laboratory:  Recent Labs     02/10/23  1438   WBC 16.5*   RBC 4.69*   HEMOGLOBIN 10.8*   HEMATOCRIT 36.0*   MCV 76.8*   MCH 23.0*   MCHC 30.0*   RDW 57.6*   PLATELETCT 268   MPV 9.5     Recent Labs     02/10/23  1438   SODIUM 150*   POTASSIUM 3.4*   CHLORIDE 117*   CO2 19*   GLUCOSE 179*   BUN 39*   CREATININE 0.88   CALCIUM 9.7     Recent Labs     02/10/23  1438   ALTSGPT 95*   ASTSGOT 227*   ALKPHOSPHAT 152*   TBILIRUBIN 3.1*   GLUCOSE 179*     Recent Labs     02/10/23  1759   INR 1.77*     No results for input(s): NTPROBNP in the last 72 hours.      Recent Labs     02/10/23  1438 02/10/23  1759   TROPONINT 71* 61*       Imaging:  NM-CARDIAC STRESS TEST    (Results Pending)   EC-ECHOCARDIOGRAM COMPLETE W/O CONT    (Results Pending)       EKG:  I have personally reviewed the images and compared with prior images.    Assessment/Plan:  Justification for Admission Status  I anticipate this patient will require at least two midnights for appropriate medical management, necessitating inpatient admission because rhadmomyolysis on ivf    Patient will need a Telemetry bed on MEDICAL service .  The need is secondary to nstemi.    * NSTEMI (non-ST elevated myocardial infarction) (HCC)- (present on admission)  Assessment & Plan  Troponin elevated at 71  EKG with atrial fibrillation, chronic  Rate controlled  On chronic anticoagulation  with Coumadin, INR of 1.9  We will continue Coumadin per pharmacy  Follow-up troponin  Stress test in a.m.  Cardiology evaluation as needed  No known history of coronary artery disease    Encephalopathy  Assessment & Plan  Patient with intermittent confusion, lives alone and independent prior to this episode  Continue neurochecks  Geriatrics consult  Rule out infection  UA pending    Leukocytosis  Assessment & Plan  Leukocytosis, UA pending  We will empirically cover with Rocephin  Follow-up procalcitonin    Dysphagia  Assessment & Plan  Failed swallow evaluation  N.p.o. for SLP evaluation  Continue IV fluids    Rhabdomyolysis  Assessment & Plan  Patient will be admitted to the telemetry telemetry inpatient unit  CPK of 3900  Continue IV fluid hydration  Patient was found down  He does report hitting his head and loss of consciousness, no focal deficits  -Continue neurochecks  Consider further neurologic work-up if worsening neurologic symptoms  PT/OT/SLP evaluation  Monitor renal function        VTE prophylaxis: SCDs/TEDs

## 2023-02-11 NOTE — PROGRESS NOTES
4 Eyes Skin Assessment Completed by GAVINO Cast and , RN.    Head Scab and Bruising   Ears Redness  Nose WDL  Mouth WDL  Neck Fragile skin  Breast/Chest Discoloration, scattered brown skin growth  Shoulder Blades WDL  Spine WDL  (R) Arm/Elbow/Hand Bruising and Scab scattered brown skin growth  (L) Arm/Elbow/Hand Bruising and Scab  Abdomen WDL  Groin Redness and Blanching  Scrotum/Coccyx/Buttocks Redness and Blanching  (R) Leg Redness, Scab, and Bruising  (L) Leg Redness, Scab, and Bruising  (R) Heel/Foot/Toe Bruising and Scab  (L) Heel/Foot/Toe Bruising          Devices In Places Tele Box, Blood Pressure Cuff, and Pulse Ox      Interventions In Place Pillows and Low Air Loss Mattress    Possible Skin Injury Yes    Pictures Uploaded Into Epic Yes  Wound Consult Placed N/A  RN Wound Prevention Protocol Ordered Yes

## 2023-02-11 NOTE — ASSESSMENT & PLAN NOTE
"Leukocytosis, UA pending  We will empirically cover with Rocephin  Follow-up procalcitonin\"    U/A some evidence if UTI  Continue antibiotics  "

## 2023-02-12 ENCOUNTER — APPOINTMENT (OUTPATIENT)
Dept: RADIOLOGY | Facility: MEDICAL CENTER | Age: 83
DRG: 557 | End: 2023-02-12
Attending: INTERNAL MEDICINE
Payer: MEDICARE

## 2023-02-12 ENCOUNTER — HOSPITAL ENCOUNTER (OUTPATIENT)
Dept: RADIOLOGY | Facility: MEDICAL CENTER | Age: 83
End: 2023-02-12

## 2023-02-12 PROBLEM — I50.22 CHRONIC SYSTOLIC (CONGESTIVE) HEART FAILURE (HCC): Status: ACTIVE | Noted: 2023-02-12

## 2023-02-12 LAB
ALBUMIN SERPL BCP-MCNC: 3.4 G/DL (ref 3.2–4.9)
ALBUMIN SERPL BCP-MCNC: 3.4 G/DL (ref 3.2–4.9)
BUN SERPL-MCNC: 38 MG/DL (ref 8–22)
BUN SERPL-MCNC: 38 MG/DL (ref 8–22)
CALCIUM ALBUM COR SERPL-MCNC: 9.7 MG/DL (ref 8.5–10.5)
CALCIUM ALBUM COR SERPL-MCNC: 9.9 MG/DL (ref 8.5–10.5)
CALCIUM SERPL-MCNC: 9.2 MG/DL (ref 8.5–10.5)
CALCIUM SERPL-MCNC: 9.4 MG/DL (ref 8.5–10.5)
CHLORIDE SERPL-SCNC: 111 MMOL/L (ref 96–112)
CHLORIDE SERPL-SCNC: 115 MMOL/L (ref 96–112)
CK SERPL-CCNC: 1075 U/L (ref 0–154)
CO2 SERPL-SCNC: 19 MMOL/L (ref 20–33)
CO2 SERPL-SCNC: 20 MMOL/L (ref 20–33)
CREAT SERPL-MCNC: 0.8 MG/DL (ref 0.5–1.4)
CREAT SERPL-MCNC: 0.94 MG/DL (ref 0.5–1.4)
ERYTHROCYTE [DISTWIDTH] IN BLOOD BY AUTOMATED COUNT: 57.8 FL (ref 35.9–50)
GFR SERPLBLD CREATININE-BSD FMLA CKD-EPI: 80 ML/MIN/1.73 M 2
GFR SERPLBLD CREATININE-BSD FMLA CKD-EPI: 87 ML/MIN/1.73 M 2
GLUCOSE SERPL-MCNC: 187 MG/DL (ref 65–99)
GLUCOSE SERPL-MCNC: 253 MG/DL (ref 65–99)
HCT VFR BLD AUTO: 34.6 % (ref 42–52)
HGB BLD-MCNC: 10.3 G/DL (ref 14–18)
INR PPP: 2.41 (ref 0.87–1.13)
MCH RBC QN AUTO: 23 PG (ref 27–33)
MCHC RBC AUTO-ENTMCNC: 29.8 G/DL (ref 33.7–35.3)
MCV RBC AUTO: 77.4 FL (ref 81.4–97.8)
MORPHOLOGY BLD-IMP: NORMAL
PHOSPHATE SERPL-MCNC: 3.4 MG/DL (ref 2.5–4.5)
PHOSPHATE SERPL-MCNC: 3.8 MG/DL (ref 2.5–4.5)
PLATELET # BLD AUTO: 285 K/UL (ref 164–446)
PMV BLD AUTO: 10 FL (ref 9–12.9)
POTASSIUM SERPL-SCNC: 3.3 MMOL/L (ref 3.6–5.5)
POTASSIUM SERPL-SCNC: 3.4 MMOL/L (ref 3.6–5.5)
PROTHROMBIN TIME: 25.5 SEC (ref 12–14.6)
RBC # BLD AUTO: 4.47 M/UL (ref 4.7–6.1)
SODIUM SERPL-SCNC: 144 MMOL/L (ref 135–145)
SODIUM SERPL-SCNC: 149 MMOL/L (ref 135–145)
WBC # BLD AUTO: 14.3 K/UL (ref 4.8–10.8)

## 2023-02-12 PROCEDURE — A9270 NON-COVERED ITEM OR SERVICE: HCPCS | Performed by: INTERNAL MEDICINE

## 2023-02-12 PROCEDURE — 85610 PROTHROMBIN TIME: CPT

## 2023-02-12 PROCEDURE — 97162 PT EVAL MOD COMPLEX 30 MIN: CPT

## 2023-02-12 PROCEDURE — 700105 HCHG RX REV CODE 258: Performed by: INTERNAL MEDICINE

## 2023-02-12 PROCEDURE — 99232 SBSQ HOSP IP/OBS MODERATE 35: CPT | Performed by: INTERNAL MEDICINE

## 2023-02-12 PROCEDURE — 36415 COLL VENOUS BLD VENIPUNCTURE: CPT

## 2023-02-12 PROCEDURE — 85027 COMPLETE CBC AUTOMATED: CPT

## 2023-02-12 PROCEDURE — 700102 HCHG RX REV CODE 250 W/ 637 OVERRIDE(OP): Performed by: INTERNAL MEDICINE

## 2023-02-12 PROCEDURE — 700111 HCHG RX REV CODE 636 W/ 250 OVERRIDE (IP)

## 2023-02-12 PROCEDURE — 80069 RENAL FUNCTION PANEL: CPT

## 2023-02-12 PROCEDURE — 700111 HCHG RX REV CODE 636 W/ 250 OVERRIDE (IP): Performed by: INTERNAL MEDICINE

## 2023-02-12 PROCEDURE — 78452 HT MUSCLE IMAGE SPECT MULT: CPT

## 2023-02-12 PROCEDURE — 770020 HCHG ROOM/CARE - TELE (206)

## 2023-02-12 PROCEDURE — 82550 ASSAY OF CK (CPK): CPT

## 2023-02-12 RX ORDER — REGADENOSON 0.08 MG/ML
0.4 INJECTION, SOLUTION INTRAVENOUS ONCE
Status: COMPLETED | OUTPATIENT
Start: 2023-02-12 | End: 2023-02-12

## 2023-02-12 RX ORDER — AMINOPHYLLINE 25 MG/ML
100 INJECTION, SOLUTION INTRAVENOUS
Status: DISCONTINUED | OUTPATIENT
Start: 2023-02-12 | End: 2023-02-14 | Stop reason: HOSPADM

## 2023-02-12 RX ORDER — REGADENOSON 0.08 MG/ML
INJECTION, SOLUTION INTRAVENOUS
Status: COMPLETED
Start: 2023-02-12 | End: 2023-02-12

## 2023-02-12 RX ADMIN — WARFARIN SODIUM 2.5 MG: 2.5 TABLET ORAL at 17:17

## 2023-02-12 RX ADMIN — SODIUM CHLORIDE, POTASSIUM CHLORIDE, SODIUM LACTATE AND CALCIUM CHLORIDE: 600; 310; 30; 20 INJECTION, SOLUTION INTRAVENOUS at 06:49

## 2023-02-12 RX ADMIN — REGADENOSON 0.4 MG: 0.08 INJECTION, SOLUTION INTRAVENOUS at 08:30

## 2023-02-12 RX ADMIN — SPIRONOLACTONE 25 MG: 25 TABLET ORAL at 06:11

## 2023-02-12 RX ADMIN — SIMVASTATIN 20 MG: 20 TABLET, FILM COATED ORAL at 17:17

## 2023-02-12 RX ADMIN — SODIUM CHLORIDE, POTASSIUM CHLORIDE, SODIUM LACTATE AND CALCIUM CHLORIDE: 600; 310; 30; 20 INJECTION, SOLUTION INTRAVENOUS at 20:51

## 2023-02-12 RX ADMIN — TAMSULOSIN HYDROCHLORIDE 0.4 MG: 0.4 CAPSULE ORAL at 06:11

## 2023-02-12 RX ADMIN — SENNOSIDES AND DOCUSATE SODIUM 2 TABLET: 50; 8.6 TABLET ORAL at 06:11

## 2023-02-12 RX ADMIN — LISINOPRIL 40 MG: 20 TABLET ORAL at 06:10

## 2023-02-12 RX ADMIN — OMEPRAZOLE 20 MG: 20 CAPSULE, DELAYED RELEASE ORAL at 06:11

## 2023-02-12 RX ADMIN — ACETAMINOPHEN 650 MG: 325 TABLET, FILM COATED ORAL at 20:47

## 2023-02-12 RX ADMIN — CEFTRIAXONE SODIUM 2000 MG: 10 INJECTION, POWDER, FOR SOLUTION INTRAVENOUS at 06:24

## 2023-02-12 RX ADMIN — DAPAGLIFLOZIN 10 MG: 10 TABLET, FILM COATED ORAL at 06:11

## 2023-02-12 RX ADMIN — SENNOSIDES AND DOCUSATE SODIUM 2 TABLET: 50; 8.6 TABLET ORAL at 17:17

## 2023-02-12 RX ADMIN — FUROSEMIDE 20 MG: 20 TABLET ORAL at 06:11

## 2023-02-12 RX ADMIN — OXYCODONE HYDROCHLORIDE 2.5 MG: 5 TABLET ORAL at 02:19

## 2023-02-12 ASSESSMENT — COGNITIVE AND FUNCTIONAL STATUS - GENERAL
WALKING IN HOSPITAL ROOM: A LOT
MOVING FROM LYING ON BACK TO SITTING ON SIDE OF FLAT BED: UNABLE
SUGGESTED CMS G CODE MODIFIER MOBILITY: CL
TURNING FROM BACK TO SIDE WHILE IN FLAT BAD: A LOT
MOVING TO AND FROM BED TO CHAIR: UNABLE
MOBILITY SCORE: 10
CLIMB 3 TO 5 STEPS WITH RAILING: TOTAL
STANDING UP FROM CHAIR USING ARMS: A LITTLE

## 2023-02-12 ASSESSMENT — FIBROSIS 4 INDEX: FIB4 SCORE: 6.78

## 2023-02-12 ASSESSMENT — PAIN DESCRIPTION - PAIN TYPE: TYPE: ACUTE PAIN

## 2023-02-12 NOTE — PROGRESS NOTES
Inpatient Anticoagulation Service Note for 2/12/2023      Reason for Anticoagulation: Atrial Fibrillation   [id number] VASc Score: 2  HAS-BLED Score: 2    Hemoglobin Value: (!) 10.3  Hematocrit Value: (!) 34.6  Lab Platelet Value: 285  Target INR: 2.0 to 3.0    INR from last 7 days       Date/Time INR Value    02/12/23 0415 2.41    02/11/23 0514 1.8    02/10/23 1759 1.77          Dose from last 7 days       Date/Time Dose (mg)    02/12/23 1016 2.5    02/11/23 1450 0    02/10/23 2003 5          Significant Interactions: Not Applicable  Bridge Therapy: No  Reversal Agent Administered: Not Applicable  Comments: Patient refused his warfarin dose yesterday evening.  Continue with home dosing and INR trend.  Education Material Provided?: No    Pharmacist suggested discharge dosing: warfarin home dosing      Mirtha Jacobo, PharmD  [id number]

## 2023-02-12 NOTE — CARE PLAN
The patient is Stable - Low risk of patient condition declining or worsening    Shift Goals  Clinical Goals: Remain free from chest pain, remain free from falls, remain free from aspirations, remain oriented x 4  Patient Goals: Eat dinner  Family Goals: BAE      Problem: Fall Risk  Goal: Patient will remain free from falls  Outcome: Progressing       Progress made toward(s) clinical / shift goals:  Unsafe behaviors noted. Pt repeatedly attempts to get out of bed without assistance. Appropriate fall precautions in place. No falls at this time. No chest pain reported, no signs of aspiration wit med admin.    Patient is not progressing towards the following goals: N/A

## 2023-02-12 NOTE — PROGRESS NOTES
"Hospital Medicine Daily Progress Note    Date of Service  2/12/2023    Chief Complaint  Perry Cross is a 82 y.o. male admitted 2/10/2023 with No chief complaint on file.        Hospital Course  No notes on file  Perry Cross is a 82 y.o. male who presented 2/10/2023 with history of atrial fibrillation on chronic anticoagulation with Coumadin presents after being found down at his home.  Patient is normally functional and independent.  He lives alone.  Patient was found to have greater than 3000 CPK with elevated troponin at outlying facility PE.  EKG with atrial fibrillation, rate controlled.  INR of 1.9.  Patient does report hitting his head, however, denies  loss of consciousness.  Urinalysis is pending.     On evaluation, patient was unable to pass a swallow evaluation.  He reports generalized body aches.  He is quite pleasant, and reports falling down the stairs.  He is unclear how the ambulance was contacted.  But doesn't believe he was on the ground for long.  Pt is alert and oriented x 3 on my exam.  However, as per staff, intermittent confusion.  Was unable to recognize his family.  [facility].\"    Dr. Ricketts 2/10/2023    Interval Problem Update  2/11. He is back to his baseline though he has poor insight and memory, he is in a good mood. Family at [facility], I spent time answering their questions reviewing results, medications and tests. Patient also on a dysphagia friendly diet  2/11/2023 Time spent [id number]  2/12. COgnitive defictis but mentation seems baseline  CT head no intracrenial bleed  MRI and EEG pending.  I have discussed this patient's plan of care and discharge plan at IDT rounds today with Case Management, Nursing, Nursing leadership, and other members of the IDT team.    Consultants/Specialty      Code Status  Full Code    Disposition  Patient is not medically cleared for discharge.   Anticipate discharge to  TBD .  I have placed the " appropriate orders for post-discharge needs.    Review of Systems  Review of Systems   Unable to perform ROS: Mental acuity      Physical Exam  Temp:  [36.5 °C (97.7 °F)-36.8 °C (98.2 °F)] 36.6 °C (97.9 °F)  Pulse:  [47-85] 66  Resp:  [15-17] 15  BP: (124-162)/() 162/87  SpO2:  [85 %-93 %] 93 %    Physical Exam  Vitals and nursing note reviewed.   Constitutional:       Comments: Elderly, thin   HENT:      Head: Normocephalic and atraumatic.      Right Ear: External ear normal.      Left Ear: External ear normal.      Nose: Nose normal.      Mouth/Throat:      Mouth: Mucous membranes are moist.   Eyes:      General: No scleral icterus.     Conjunctiva/sclera: Conjunctivae normal.   Cardiovascular:      Rate and Rhythm: Normal rate and regular rhythm.      Heart sounds: No murmur heard.    No friction rub. No gallop.   Pulmonary:      Effort: Pulmonary effort is normal.      Breath sounds: Normal breath sounds.   Abdominal:      General: Abdomen is flat. Bowel sounds are normal. There is no distension.      Palpations: Abdomen is soft.      Tenderness: There is no abdominal tenderness. There is no guarding.   Musculoskeletal:         General: Normal range of motion.      Cervical back: Normal range of motion and neck supple.   Skin:     General: Skin is warm.   Neurological:      Mental Status: He is alert and oriented to person, place, and time. Mental status is at baseline.      Motor: Weakness present.      Comments: Memorry issues  Cognitive deficits   Psychiatric:         Mood and Affect: Mood normal.         Behavior: Behavior normal.         Thought Content: Thought content normal.         Judgment: Judgment normal.       Fluids    Intake/Output Summary (Last 24 hours) at 2/12/2023 0811  Last data filed at 2/12/2023 0736  Gross per 24 hour   Intake 2060 ml   Output 600 ml   Net 1460 ml         Laboratory  Recent Labs     02/10/23  1438 02/11/23  0514 02/12/23  0415   WBC 16.5* 17.2* 14.3*   RBC 4.69*  "4.69* 4.47*   HEMOGLOBIN 10.8* 10.8* 10.3*   HEMATOCRIT 36.0* 36.8* 34.6*   MCV 76.8* 78.5* 77.4*   MCH 23.0* 23.0* 23.0*   MCHC 30.0* 29.3* 29.8*   RDW 57.6* 59.2* 57.8*   PLATELETCT 268 279 285   MPV 9.5 9.8 10.0       Recent Labs     02/11/23  0514 02/11/23  1732 02/12/23  0415   SODIUM 150* 146* 149*   POTASSIUM 3.5* 3.4* 3.4*   CHLORIDE 118* 113* 115*   CO2 18* 21 20   GLUCOSE 152* 221* 187*   BUN 38* 36* 38*   CREATININE 0.72 0.74 0.80   CALCIUM 9.4 9.7 9.4       Recent Labs     02/10/23  1759 02/11/23  0514 02/12/23  0415   INR 1.77* 1.80* 2.41*           Recent Labs     02/11/23  0514   TRIGLYCERIDE 155*   HDL 58   LDL 62         Imaging  CT-HEAD W/O   Final Result      1.  No evidence of acute intracranial process.      2.  Cerebral atrophy as well as periventricular chronic small vessel ischemic change.         EC-ECHOCARDIOGRAM COMPLETE W/O CONT   Final Result      NM-CARDIAC STRESS TEST    (Results Pending)   MR-BRAIN-W/O    (Results Pending)          Assessment/Plan  * Rhabdomyolysis, hypernatremia/dehydration, UTI, elevated troponin, h/o mild CHF and mod pul HTN  Assessment & Plan  Patient will be admitted to the telemetry telemetry inpatient unit  CPK of 3900  Continue IV fluid hydration  Patient was found down  He does report hitting his head and loss of consciousness, no focal deficits  -Continue neurochecks  Consider further neurologic work-up if worsening neurologic symptoms  PT/OT/SLP evaluation  Monitor renal function\"    Gentle IV hydration  1-2 glasses free water per meal  Restart diuretics and spironolactone  Trend Na.    Chronic systolic (congestive) heart failure (HCC)  Assessment & Plan  Moderate pul HTN  Mild on echo  Lasix, BB, ARB, spironolactone    Encephalopathy  Assessment & Plan  Patient with intermittent confusion, lives alone and independent prior to this episode  Continue neurochecks  Geriatrics consult  Rule out infection  UA pending\"    Seems to be back to baseline  U/A show " "evidence of UTI thus Abx  Reviewed head CT results with family-normal    Leukocytosis  Assessment & Plan  Leukocytosis, UA pending  We will empirically cover with Rocephin  Follow-up procalcitonin\"    U/A some evidence if UTI  Continue antibiotics    Dysphagia  Assessment & Plan  Failed swallow evaluation  N.p.o. for SLP evaluation  Continue IV fluids\"    Now on pureed liquids but if tolerating do clear liquid         VTE prophylaxis: therapeutic anticoagulation with warfarin    I have performed a physical exam and reviewed and updated ROS and Plan today (2/12/2023). In review of yesterday's note (2/11/2023), there are no changes except as documented above.        "

## 2023-02-12 NOTE — PROGRESS NOTES
Med Rec complete per patient's home pharmacy  No oral antibiotics in the last 30 days per pharmacy  Allergies reviewed  Preferred Pharmacy: [facility]  Patient unable to participate in interview    Atenolol 50 mg daily and Metformin 1000 mg last filled in August of 2022

## 2023-02-12 NOTE — DISCHARGE PLANNING
"Care Transition Team Assessment    RN TAMI spoke with patient in room. Role of CM explained. Demographic info verified. Patient lives alone in [place], [place]. Verbalizes he is independent with ADL's but uses a cane. Patient states he thinks it may be time for him to acquire a walker. Patient also states that he has very good support at home with his friends & neighbors. No  services at this time and patient states he still drives. Patient states his friend & neighbor \"Dulce\" will p/u upon discharge. CM will continue to follow for any discharge needs.    Information Source  Orientation Level: Oriented X4  Information Given By: Patient  Who is responsible for making decisions for patient? : Patient    Elopement Risk  Legal Hold: No  Ambulatory or Self Mobile in Wheelchair: No-Not an Elopement Risk  Elopement Risk: Not at Risk for Elopement    Interdisciplinary Discharge Planning  Lives with - Patient's Self Care Capacity: Alone and Able to Care For Self  Patient or legal guardian wants to designate a caregiver: No  Support Systems: Friends / Neighbors, Family Member(s)  Housing / Facility: [address]  Do You Take your Prescribed Medications Regularly: Yes  Mobility Issues: Yes  Prior Services: None  Assistance Needed: Unknown at this Time  Durable Medical Equipment: Other - Specify (cane)    Discharge Preparedness  What is your plan after discharge?: Uncertain - pending medical team collaboration  What are your discharge supports?: Other (comment) (neighbor)  Prior Functional Level: Independent with Activities of Daily Living  Difficulity with ADLs: Walking    Functional Assesment  Prior Functional Level: Independent with Activities of Daily Living    Vision / Hearing Impairment  Vision Impairment : Yes  Right Eye Vision: Wears Glasses  Left Eye Vision: Wears Glasses  Hearing Impairment : No    Domestic Abuse  Have you ever been the victim of abuse or violence?: No  Physical Abuse or Sexual Abuse: No  Verbal Abuse " or Emotional Abuse: No  Possible Abuse/Neglect Reported to:: Not Applicable    Anticipated Discharge Information  Discharge Disposition: D/T to home under HHA care in anticipation of covered skilled care (06)

## 2023-02-12 NOTE — THERAPY
02/11/23 1628   Initial Contact Note    Initial Contact Note Order Received and Verified, Physical Therapy Evaluation in Progress with Full Report to Follow.   Interdisciplinary Plan of Care Collaboration   IDT Collaboration with  Nursing   Collaboration Comments pt at stress test not available for eval. will reattempt as able   Session Information   Date / Session Number  2/11/23 attempted (EVAL)

## 2023-02-12 NOTE — THERAPY
"Physical Therapy   Initial Evaluation     Patient Name: Perry Cross  Age:  82 y.o., Sex:  male  Medical Record #: [id number]  Today's Date: 2/12/2023     Precautions  Precautions: Fall Risk;Swallow Precautions ( See Comments)    Assessment  Patient is 82 y.o. male admitted after being found down with rhabdomyolysis, encephalopathy, and leukocytosis (UA pending). CT head and cervical without acute findings. On arrival in room, pt reported to therapist that he has been having hallucinations including seeing pictures of relatives that aren't present. His timeline was off with time of admission and events. He states he fell down his stairs recently and bruised his R ankle which is still causing pain. Min assist required for bed mobility, min assist with STS, and min assist transfer to chair with FWW. PT will cont while pt is in acute care setting to address strength, balance, pain, activity tolerance, safety, and mobility.     Plan    Physical Therapy Initial Treatment Plan   Treatment Plan : Bed Mobility, Gait Training, Neuro Re-Education / Balance, Self Care / Home Evaluation, Stair Training, Therapeutic Activities, Therapeutic Exercise  Treatment Frequency: 4 Times per Week  Duration: Until Therapy Goals Met    DC Equipment Recommendations: Unable to determine at this time  Discharge Recommendations: Recommend post-acute placement for additional physical therapy services prior to discharge home       Subjective    \"I've been here for 10 days\" pt reoriented to 2 day admission         02/12/23 1044   Prior Living Situation   Prior Services Home-Independent   Housing / Facility 2 [address] House   Steps Into Home 5   Steps In Home 14   Equipment Owned Single Point Cane   Lives with - Patient's Self Care Capacity Alone and Able to Care For Self   Comments pt lives alone with a neighbor that checks on him. Limited social support. Pt is typically independent and living in [place]   Prior Level of Functional Mobility   Bed " Mobility Independent   Transfer Status Independent   Ambulation Independent   Distance Ambulation (Feet)   (community)   Assistive Devices Used Single Point Cane   Stairs Independent   Comments independent with mobility prior   History of Falls   History of Falls Yes   Date of Last Fall   (recent fall reported by pt)   Cognition    Cognition / Consciousness X   Orientation Level Oriented x 4   Level of Consciousness Alert   Ability To Follow Commands 2 Step   Safety Awareness Impaired;Impulsive   New Learning Impaired   Comments pt perseverating on having metal stuck in his body from a prior surgery that is making him hallucinate   Active ROM Lower Body    Active ROM Lower Body  WDL   Strength Lower Body   Lower Body Strength  X   Gross Strength Generalized Weakness, Equal Bilaterally   Other Treatments   Other Treatments Provided pts timeline off. He kept referring to being in the hospital for 10 days and not being able to eat or drink until today   Balance Assessment   Sitting Balance (Static) Fair   Sitting Balance (Dynamic) Fair -   Standing Balance (Static) Fair -   Standing Balance (Dynamic) Poor +   Weight Shift Sitting Fair   Weight Shift Standing Fair   Comments FWW   Bed Mobility    Supine to Sit Minimal Assist   Scooting Minimal Assist   Comments HOB raised, cues for use of bed rails   Gait Analysis   Comments tx to chair   Functional Mobility   Sit to Stand Minimal Assist   Bed, Chair, Wheelchair Transfer Minimal Assist   Transfer Method Stand Step   Mobility in room with FWW   Patient / Family Goals    Patient / Family Goal #1 none stated   Short Term Goals    Short Term Goal # 1 pt will be able to complete supine<>Sitting from flat bed with SPV in 6tx in order to dc home   Short Term Goal # 2 pt will be able to complete functional transfers with FWW and SPV in 6tx in order to dc home   Short Term Goal # 3 pt will be able to ambulate 100ft with FWW and SPV in 6tx in order to dc home   Short Term Goal #  4 pt will be able to negotiate 5 steps with SPV in 6tx in order to enter and exit home   Education Group   Education Provided Role of Physical Therapist   Role of Physical Therapist Patient Response Patient;Acceptance;Demonstration;Verbal Demonstration;Action Demonstration   Anticipated Discharge Equipment and Recommendations   DC Equipment Recommendations Unable to determine at this time   Discharge Recommendations Recommend post-acute placement for additional physical therapy services prior to discharge home

## 2023-02-13 PROBLEM — Z86.79 HISTORY OF ATRIAL FIBRILLATION: Status: ACTIVE | Noted: 2023-02-13

## 2023-02-13 PROBLEM — Z79.01 CHRONIC ANTICOAGULATION: Status: ACTIVE | Noted: 2023-02-13

## 2023-02-13 LAB
ALBUMIN SERPL BCP-MCNC: 3.3 G/DL (ref 3.2–4.9)
ALBUMIN SERPL BCP-MCNC: 3.4 G/DL (ref 3.2–4.9)
BUN SERPL-MCNC: 34 MG/DL (ref 8–22)
BUN SERPL-MCNC: 34 MG/DL (ref 8–22)
CALCIUM ALBUM COR SERPL-MCNC: 9.5 MG/DL (ref 8.5–10.5)
CALCIUM ALBUM COR SERPL-MCNC: 9.5 MG/DL (ref 8.5–10.5)
CALCIUM SERPL-MCNC: 8.9 MG/DL (ref 8.5–10.5)
CALCIUM SERPL-MCNC: 9 MG/DL (ref 8.5–10.5)
CHLORIDE SERPL-SCNC: 110 MMOL/L (ref 96–112)
CHLORIDE SERPL-SCNC: 111 MMOL/L (ref 96–112)
CO2 SERPL-SCNC: 20 MMOL/L (ref 20–33)
CO2 SERPL-SCNC: 23 MMOL/L (ref 20–33)
CREAT SERPL-MCNC: 0.87 MG/DL (ref 0.5–1.4)
CREAT SERPL-MCNC: 0.92 MG/DL (ref 0.5–1.4)
ERYTHROCYTE [DISTWIDTH] IN BLOOD BY AUTOMATED COUNT: 58.9 FL (ref 35.9–50)
GFR SERPLBLD CREATININE-BSD FMLA CKD-EPI: 82 ML/MIN/1.73 M 2
GFR SERPLBLD CREATININE-BSD FMLA CKD-EPI: 85 ML/MIN/1.73 M 2
GLUCOSE SERPL-MCNC: 204 MG/DL (ref 65–99)
GLUCOSE SERPL-MCNC: 305 MG/DL (ref 65–99)
HCT VFR BLD AUTO: 35.5 % (ref 42–52)
HGB BLD-MCNC: 10.3 G/DL (ref 14–18)
INR PPP: 2.84 (ref 0.87–1.13)
MCH RBC QN AUTO: 23 PG (ref 27–33)
MCHC RBC AUTO-ENTMCNC: 29 G/DL (ref 33.7–35.3)
MCV RBC AUTO: 79.4 FL (ref 81.4–97.8)
MORPHOLOGY BLD-IMP: NORMAL
PHOSPHATE SERPL-MCNC: 3.3 MG/DL (ref 2.5–4.5)
PHOSPHATE SERPL-MCNC: 3.6 MG/DL (ref 2.5–4.5)
PLATELET # BLD AUTO: 298 K/UL (ref 164–446)
PMV BLD AUTO: 10.8 FL (ref 9–12.9)
POTASSIUM SERPL-SCNC: 3.1 MMOL/L (ref 3.6–5.5)
POTASSIUM SERPL-SCNC: 3.7 MMOL/L (ref 3.6–5.5)
PROTHROMBIN TIME: 28.9 SEC (ref 12–14.6)
RBC # BLD AUTO: 4.47 M/UL (ref 4.7–6.1)
SODIUM SERPL-SCNC: 143 MMOL/L (ref 135–145)
SODIUM SERPL-SCNC: 145 MMOL/L (ref 135–145)
WBC # BLD AUTO: 10.1 K/UL (ref 4.8–10.8)

## 2023-02-13 PROCEDURE — 4A10X4Z MONITORING OF CENTRAL NERVOUS ELECTRICAL ACTIVITY, EXTERNAL APPROACH: ICD-10-PCS | Performed by: PSYCHIATRY & NEUROLOGY

## 2023-02-13 PROCEDURE — 85610 PROTHROMBIN TIME: CPT

## 2023-02-13 PROCEDURE — 95819 EEG AWAKE AND ASLEEP: CPT | Mod: 26 | Performed by: PSYCHIATRY & NEUROLOGY

## 2023-02-13 PROCEDURE — 99232 SBSQ HOSP IP/OBS MODERATE 35: CPT | Performed by: INTERNAL MEDICINE

## 2023-02-13 PROCEDURE — 85027 COMPLETE CBC AUTOMATED: CPT

## 2023-02-13 PROCEDURE — 700111 HCHG RX REV CODE 636 W/ 250 OVERRIDE (IP): Performed by: INTERNAL MEDICINE

## 2023-02-13 PROCEDURE — 700105 HCHG RX REV CODE 258: Performed by: INTERNAL MEDICINE

## 2023-02-13 PROCEDURE — 95819 EEG AWAKE AND ASLEEP: CPT | Performed by: PSYCHIATRY & NEUROLOGY

## 2023-02-13 PROCEDURE — 700102 HCHG RX REV CODE 250 W/ 637 OVERRIDE(OP): Performed by: INTERNAL MEDICINE

## 2023-02-13 PROCEDURE — 36415 COLL VENOUS BLD VENIPUNCTURE: CPT

## 2023-02-13 PROCEDURE — A9270 NON-COVERED ITEM OR SERVICE: HCPCS | Performed by: INTERNAL MEDICINE

## 2023-02-13 PROCEDURE — 80069 RENAL FUNCTION PANEL: CPT | Mod: 91

## 2023-02-13 PROCEDURE — 770020 HCHG ROOM/CARE - TELE (206)

## 2023-02-13 RX ORDER — POTASSIUM CHLORIDE 20 MEQ/1
40 TABLET, EXTENDED RELEASE ORAL DAILY
Status: DISCONTINUED | OUTPATIENT
Start: 2023-02-13 | End: 2023-02-14

## 2023-02-13 RX ORDER — WARFARIN SODIUM 3 MG/1
1.5 TABLET ORAL DAILY
Status: DISCONTINUED | OUTPATIENT
Start: 2023-02-13 | End: 2023-02-13

## 2023-02-13 RX ORDER — WARFARIN SODIUM 2 MG/1
2 TABLET ORAL ONCE
Status: COMPLETED | OUTPATIENT
Start: 2023-02-13 | End: 2023-02-13

## 2023-02-13 RX ADMIN — LISINOPRIL 40 MG: 20 TABLET ORAL at 05:01

## 2023-02-13 RX ADMIN — SODIUM CHLORIDE, POTASSIUM CHLORIDE, SODIUM LACTATE AND CALCIUM CHLORIDE: 600; 310; 30; 20 INJECTION, SOLUTION INTRAVENOUS at 05:11

## 2023-02-13 RX ADMIN — ATENOLOL 50 MG: 50 TABLET ORAL at 04:58

## 2023-02-13 RX ADMIN — CEFTRIAXONE SODIUM 2000 MG: 10 INJECTION, POWDER, FOR SOLUTION INTRAVENOUS at 04:56

## 2023-02-13 RX ADMIN — ACETAMINOPHEN 650 MG: 325 TABLET, FILM COATED ORAL at 08:51

## 2023-02-13 RX ADMIN — SPIRONOLACTONE 25 MG: 25 TABLET ORAL at 05:00

## 2023-02-13 RX ADMIN — TAMSULOSIN HYDROCHLORIDE 0.4 MG: 0.4 CAPSULE ORAL at 04:58

## 2023-02-13 RX ADMIN — POTASSIUM CHLORIDE 40 MEQ: 1500 TABLET, EXTENDED RELEASE ORAL at 08:51

## 2023-02-13 RX ADMIN — SIMVASTATIN 20 MG: 20 TABLET, FILM COATED ORAL at 18:28

## 2023-02-13 RX ADMIN — ACETAMINOPHEN 650 MG: 325 TABLET, FILM COATED ORAL at 03:10

## 2023-02-13 RX ADMIN — OMEPRAZOLE 20 MG: 20 CAPSULE, DELAYED RELEASE ORAL at 04:58

## 2023-02-13 RX ADMIN — WARFARIN SODIUM 2 MG: 2 TABLET ORAL at 18:28

## 2023-02-13 RX ADMIN — FUROSEMIDE 20 MG: 20 TABLET ORAL at 05:00

## 2023-02-13 RX ADMIN — DAPAGLIFLOZIN 10 MG: 10 TABLET, FILM COATED ORAL at 05:00

## 2023-02-13 ASSESSMENT — PAIN DESCRIPTION - PAIN TYPE
TYPE: ACUTE PAIN;CHRONIC PAIN
TYPE: ACUTE PAIN
TYPE: ACUTE PAIN

## 2023-02-13 NOTE — DISCHARGE PLANNING
RN CM spoke with patient at bedside concerning therapy recommendations for post-acute placement. Patient is agreeable and does not have a preference. Referral sent to [facility]. Await response.

## 2023-02-13 NOTE — DOCUMENTATION QUERY
[facility]                                                                       Query Response Note      PATIENT:               KAJAL HOLLAND  ACCT #:                  [id number]  MRN:                     [id number]  :                      1940  ADMIT DATE:       2/10/2023 1:59 PM  DISCH DATE:          RESPONDING  PROVIDER #:        [id number]           QUERY TEXT:    Encephalopathy is documented in the Medical Record. Please specify type.    The patient's Clinical Indicators include:  Findings: 2/10 H&P: Pt is alert and oriented x 3 on my exam.  However, as per staff, intermittent confusion.  Was  unable to recognize his family. Patient with intermittent confusion, lives alone and independent prior to this episode     PN: Encephalopathy    Treatment: 2/10 H&P: neurochecks, geriatrics consult     PN: Rocephin    Risk Factors:  PN: U/A show evidence of UTI    Charis Reynolds RN BSN  Clinical Documentation   [email or web address]  Connect via [employer] Messenger  Options provided:   -- Metabolic encephalopathy   -- Toxic encephalopathy   -- Other explanation, (please specify other explanation)   -- Unable to determine      Query created by: Charis Garcia on 2023 6:30 AM    RESPONSE TEXT:    Other explanation - Toxic-metabolic (multifactorial) encephalopathy          Electronically signed by:  CHETAN HASKINS MD 2023 7:50 AM

## 2023-02-13 NOTE — DISCHARGE PLANNING
"RN TAMI spoke with patient's niece, \"Aretha\" via phone. Discussed discharge disposition to [facility] [place], as they have accepted. She states as long as her uncle is good with the facility, she is as well. Provided niece with facility name & contact info as well as CM direct phone number. Notified attending MD.  "

## 2023-02-13 NOTE — PROGRESS NOTES
Assumed care, bedside report received from Qing MANCIA. Pt. Is Afib on the monitor. Initial assessment completed, orders reviewed, call light within reach, bed alarm is in use, and hourly rounding in place. POC addressed with patient, no additional questions at this time.

## 2023-02-13 NOTE — PROCEDURES
VIDEO ELECTROENCEPHALOGRAM REPORT      Referring provider: Dr. Miller    DOS:  02/13/23  (total recording of 25 minutes).     INDICATION:  Perry Cross 82 y.o. male presenting with loss of consciousness     CURRENT ANTIEPILEPTIC REGIMEN: none     TECHNIQUE: 30 channel video electroencephalogram (EEG) was performed in accordance with the international 10-20 system. The study was reviewed in bipolar and referential montages. The recording examined the patient during awake, drowsy and sleep states    DESCRIPTION OF THE RECORD:  During wakefulness, the background consisted of diffuse theta range slowing at 7 Hz.No well-formed posterior dominant rhythm or anterior-posterior gradient was seen.  With drowsiness, there was attenuation of the background theta activity and increased fast frequency. As the patient enters into sleep, rudimentary vertex waves and symmetrical spindles were noted.     ACTIVATION PROCEDURE:  hyperventilation was not performed    Intermittent Photic stimulation was performed in a stepwise fashion from 1 to 30 Hz and elicited no photic driving response.     ICTAL AND/OR INTERICTAL FINDINGS:   No focal or generalized epileptiform activity noted. No regional slowing was seen during this routine study.  No clinical events or seizures were reported or recorded during the study.     EKG: sampling of the EKG recording demonstrated a fib.     EVENTS: none     INTERPRETATION:    This is an abnormal video EEG recording in the awake, drowsy and sleep states. The diffuse background slowing is consistent with mild to moderate encephalopathy.  No epileptiform discharges or seizures were seen. This does not preclude a diagnosis of epilepsy.     Thang Herrera MD  Diplomate in Neurology&Epilepsy  Office: [phone]  Fax: [phone]

## 2023-02-13 NOTE — CARE PLAN
The patient is Stable - Low risk of patient condition declining or worsening    Shift Goals  Clinical Goals: Remain free from falls, remain free from aspirations, remain oriented x 4  Patient Goals: comfort, rest  Family Goals: ABE    Progress made toward(s) clinical / shift goals:      Problem: Knowledge Deficit - Standard  Goal: Patient and family/care givers will demonstrate understanding of plan of care, disease process/condition, diagnostic tests and medications  Outcome: Progressing     Problem: Fall Risk  Goal: Patient will remain free from falls  Outcome: Progressing     Problem: Dysphagia  Goal: Dysphagia will improve  Outcome: Progressing     Problem: Risk for Aspiration  Goal: Patient's risk for aspiration will be absent or decrease  Outcome: Progressing

## 2023-02-13 NOTE — CARE PLAN
The patient is Stable - Low risk of patient condition declining or worsening    Shift Goals  Clinical Goals: Monitor fluid intake, safety  Patient Goals: pain free  Family Goals: ABE    Problem: Knowledge Deficit - Standard  Goal: Patient and family/care givers will demonstrate understanding of plan of care, disease process/condition, diagnostic tests and medications  Outcome: Progressing     Problem: Fall Risk  Goal: Patient will remain free from falls  Outcome: Progressing     Problem: Nutrition  Goal: Patient's nutritional and fluid intake will be adequate or improve  Outcome: Progressing

## 2023-02-13 NOTE — DISCHARGE PLANNING
Based on discussion with attending during afternoon rounds, plan for patient to discharge tomorrow, Tues., 2/14. Patient will go to [facility] [place]. Facility will p/u patient in w/c van at 12pm.

## 2023-02-14 ENCOUNTER — APPOINTMENT (OUTPATIENT)
Dept: RADIOLOGY | Facility: MEDICAL CENTER | Age: 83
DRG: 557 | End: 2023-02-14
Attending: STUDENT IN AN ORGANIZED HEALTH CARE EDUCATION/TRAINING PROGRAM
Payer: MEDICARE

## 2023-02-14 ENCOUNTER — PATIENT OUTREACH (OUTPATIENT)
Dept: SCHEDULING | Facility: IMAGING CENTER | Age: 83
End: 2023-02-14

## 2023-02-14 VITALS
HEIGHT: 72 IN | OXYGEN SATURATION: 94 % | HEART RATE: 70 BPM | RESPIRATION RATE: 16 BRPM | TEMPERATURE: 98.6 F | DIASTOLIC BLOOD PRESSURE: 97 MMHG | WEIGHT: 185 LBS | BODY MASS INDEX: 25.06 KG/M2 | SYSTOLIC BLOOD PRESSURE: 156 MMHG

## 2023-02-14 LAB
ERYTHROCYTE [DISTWIDTH] IN BLOOD BY AUTOMATED COUNT: 57.3 FL (ref 35.9–50)
HCT VFR BLD AUTO: 35.8 % (ref 42–52)
HGB BLD-MCNC: 10.8 G/DL (ref 14–18)
INR PPP: 3.45 (ref 0.87–1.13)
MCH RBC QN AUTO: 23.4 PG (ref 27–33)
MCHC RBC AUTO-ENTMCNC: 30.2 G/DL (ref 33.7–35.3)
MCV RBC AUTO: 77.7 FL (ref 81.4–97.8)
PLATELET # BLD AUTO: 303 K/UL (ref 164–446)
PMV BLD AUTO: 9.9 FL (ref 9–12.9)
PROTHROMBIN TIME: 33.5 SEC (ref 12–14.6)
RBC # BLD AUTO: 4.61 M/UL (ref 4.7–6.1)
SARS-COV+SARS-COV-2 AG RESP QL IA.RAPID: NOTDETECTED
SPECIMEN SOURCE: NORMAL
WBC # BLD AUTO: 9 K/UL (ref 4.8–10.8)

## 2023-02-14 PROCEDURE — 36415 COLL VENOUS BLD VENIPUNCTURE: CPT

## 2023-02-14 PROCEDURE — 70450 CT HEAD/BRAIN W/O DYE: CPT

## 2023-02-14 PROCEDURE — A9270 NON-COVERED ITEM OR SERVICE: HCPCS | Performed by: INTERNAL MEDICINE

## 2023-02-14 PROCEDURE — 85610 PROTHROMBIN TIME: CPT

## 2023-02-14 PROCEDURE — 700102 HCHG RX REV CODE 250 W/ 637 OVERRIDE(OP): Performed by: INTERNAL MEDICINE

## 2023-02-14 PROCEDURE — 99239 HOSP IP/OBS DSCHRG MGMT >30: CPT | Performed by: STUDENT IN AN ORGANIZED HEALTH CARE EDUCATION/TRAINING PROGRAM

## 2023-02-14 PROCEDURE — 87426 SARSCOV CORONAVIRUS AG IA: CPT

## 2023-02-14 PROCEDURE — 85027 COMPLETE CBC AUTOMATED: CPT

## 2023-02-14 PROCEDURE — 92526 ORAL FUNCTION THERAPY: CPT

## 2023-02-14 RX ORDER — SIMVASTATIN 20 MG
20 TABLET ORAL NIGHTLY
Qty: 30 TABLET | Refills: 0
Start: 2023-02-14

## 2023-02-14 RX ORDER — LISINOPRIL 40 MG/1
40 TABLET ORAL DAILY
Qty: 30 TABLET | Refills: 0
Start: 2023-02-14 | End: 2025-02-13

## 2023-02-14 RX ORDER — OMEPRAZOLE 20 MG/1
20 CAPSULE, DELAYED RELEASE ORAL DAILY
Qty: 30 CAPSULE | Refills: 0
Start: 2023-02-14

## 2023-02-14 RX ADMIN — DAPAGLIFLOZIN 10 MG: 10 TABLET, FILM COATED ORAL at 05:36

## 2023-02-14 RX ADMIN — TAMSULOSIN HYDROCHLORIDE 0.4 MG: 0.4 CAPSULE ORAL at 05:36

## 2023-02-14 RX ADMIN — FUROSEMIDE 20 MG: 20 TABLET ORAL at 05:36

## 2023-02-14 RX ADMIN — LISINOPRIL 40 MG: 20 TABLET ORAL at 05:39

## 2023-02-14 RX ADMIN — SPIRONOLACTONE 25 MG: 25 TABLET ORAL at 05:39

## 2023-02-14 RX ADMIN — POTASSIUM CHLORIDE 40 MEQ: 1500 TABLET, EXTENDED RELEASE ORAL at 05:36

## 2023-02-14 RX ADMIN — OMEPRAZOLE 20 MG: 20 CAPSULE, DELAYED RELEASE ORAL at 05:39

## 2023-02-14 ASSESSMENT — PAIN DESCRIPTION - PAIN TYPE
TYPE: CHRONIC PAIN
TYPE: ACUTE PAIN;CHRONIC PAIN

## 2023-02-14 NOTE — PROGRESS NOTES
Inpatient Anticoagulation Service Note for 2/13/2023      Reason for Anticoagulation: Atrial Fibrillation   [id number] VASc Score: 2  HAS-BLED Score: 2    Hemoglobin Value: (!) 10.3  Hematocrit Value: (!) 35.5  Lab Platelet Value: 298  Target INR: 2.0 to 3.0    INR from last 7 days       Date/Time INR Value    02/13/23 0252 2.84    02/12/23 0415 2.41    02/11/23 0514 1.8    02/10/23 1759 1.77          Dose from last 7 days       Date/Time Dose (mg)    02/13/23 1637 2    02/12/23 1016 2.5    02/11/23 1450 2.5    02/10/23 2003 5          Significant Interactions: Not Applicable  Bridge Therapy: No (If less than 5 days and overlap therapy discontinued -- document reason (i.e. Bleed Risk))    (If still on overlap therapy, if No -- document reason (i.e. Bleed Risk))    Reversal Agent Administered: Not Applicable  Comments: Patient's INR is therapeutic, however INR has increased quickly from 2.41 to 2.84. Will give x1 time smaller dose of 2mg tonight and then will resume home dosing therafter. No bleeding concerns. Pharmacy will monitor.    Plan: warfarin 2mg x1 then resume home dosing   Education Material Provided?: No    Pharmacist suggested discharge dosing: Resume home dose      Bel Nicole, PharmD

## 2023-02-14 NOTE — PROGRESS NOTES
Bedside report received by Cary MANCIA and patient care assumed. Tele Box in place, patient is A&O4, resting in bed, and on RA. Patient states 0/10 pain. Fall precautions in place and patient educated on use of call light for assistance. Pt updated on POC with no questions or concerns. Hourly monitoring initiated.

## 2023-02-14 NOTE — DISCHARGE SUMMARY
Discharge Summary    CHIEF COMPLAINT ON ADMISSION  No chief complaint on file.      Reason for Admission  NSTEMI, rhabdomyolysis    Admission Date  2/10/2023     CODE STATUS  Full Code    HPI & HOSPITAL COURSE  This is a 82 y.o. male here with found down at home. Patient with history of atrial fibrillation on coumadin, presents from home, found down on floor. He denies losing consciousness, had a fall and was unable to get back up. Patient found to have rhabdomyolysis, metabolic encephalopathy, and possible urinary tract infection. Patient was treated with IV fluid resuscitation for rhabdomyolysis with improvement in CPK levels. He was also given antibiotics for possible UTI, cultures no growth to date. There was concern for possible acute stroke due to clinical picture. CT head without acute abnormalities. MRI brain ordered however unable to be performed due to availability. EEG performed showing no active seizure activity. Repeat CT head without contrast done day of discharge which is unchanged from prior initial CT head. Patient feeling well, his encephalopathy has resolved. PT and OT recommend SNF. Patient doing well.    Therefore, he is discharged in fair and stable condition to skilled nursing facility.    The patient met 2-midnight criteria for an inpatient stay at the time of discharge.      FOLLOW UP ITEMS POST DISCHARGE  Take medications as prescribed.  Follow up with PCP, follow up per SNF.    DISCHARGE DIAGNOSES  Principal Problem:    Rhabdomyolysis, hypernatremia/dehydration, UTI, elevated troponin, h/o mild CHF and mod pul HTN POA: Unknown  Active Problems:    Dysphagia POA: Unknown    Leukocytosis POA: Unknown    Encephalopathy POA: Unknown    Chronic systolic (congestive) heart failure (HCC) POA: Unknown    Chronic anticoagulation POA: Unknown    History of atrial fibrillation POA: Unknown  Resolved Problems:    NSTEMI (non-ST elevated myocardial infarction) ([facility]) POA: Yes      FOLLOW UP  No future  appointments.  Luiz Lee M.D.  [address]  [place] [address]  [phone]    Go on 2/28/2023  Please go to your follow up appointment with Luiz Lee M.D. on Tuesday February 28,2023 at 1:40pm.      MEDICATIONS ON DISCHARGE     Medication List        CHANGE how you take these medications        Instructions   omeprazole 20 MG delayed-release capsule  What changed: medication strength  Commonly known as: PRILOSEC   Take 1 Capsule by mouth every day.  Dose: 20 mg     simvastatin 20 MG Tabs  What changed: when to take this  Commonly known as: ZOCOR   Take 1 Tablet by mouth every evening.  Dose: 20 mg            CONTINUE taking these medications        Instructions   dapagliflozin propanediol 10 MG Tabs  Commonly known as: Farxiga   Take 10 mg by mouth every day.  Dose: 10 mg     furosemide 20 MG Tabs  Commonly known as: LASIX   Take 20 mg by mouth every day.  Dose: 20 mg     glipiZIDE SR 5 MG Tb24  Commonly known as: GLUCOTROL   Take 5 mg by mouth every day.  Dose: 5 mg     lisinopril 40 MG tablet  Commonly known as: PRINIVIL   Take 1 Tablet by mouth every day.  Dose: 40 mg     spironolactone 25 MG Tabs  Commonly known as: ALDACTONE   Take 25 mg by mouth every day.  Dose: 25 mg     tamsulosin 0.4 MG capsule  Commonly known as: FLOMAX   Take 0.4 mg by mouth every day.  Dose: 0.4 mg     warfarin 1 MG Tabs  Commonly known as: COUMADIN   Take 1 mg by mouth every day.  Dose: 1 mg              Allergies  No Known Allergies    DIET  Orders Placed This Encounter   Procedures    Diet Order Diet: Regular     Standing Status:   Standing     Number of Occurrences:   1     Order Specific Question:   Diet:     Answer:   Regular [1]       ACTIVITY  As tolerated and directed by skilled nursing.  Weight bearing as tolerated    LINES, DRAINS, AND WOUNDS  This is an automated list. Peripheral IVs will be removed prior to discharge.  Peripheral IV 02/10/23 20 G Right Antecubital (Active)   Site Assessment  Clean;Dry;Intact 02/14/23 0805   Dressing Type Transparent 02/14/23 0805   Line Status Scrubbed the hub prior to access;Saline locked;Flushed 02/14/23 0805   Dressing Status Clean;Dry;Intact 02/14/23 0805   Dressing Intervention N/A 02/14/23 0805   Dressing Change Due 02/18/23 02/14/23 0805   Infiltration Grading ([facility]) 0 02/14/23 0805   Phlebitis Scale ([facility] Only) 0 02/14/23 0805          Peripheral IV 02/10/23 20 G Right Antecubital (Active)   Site Assessment Clean;Dry;Intact 02/14/23 0805   Dressing Type Transparent 02/14/23 0805   Line Status Scrubbed the hub prior to access;Saline locked;Flushed 02/14/23 0805   Dressing Status Clean;Dry;Intact 02/14/23 0805   Dressing Intervention N/A 02/14/23 0805   Dressing Change Due 02/18/23 02/14/23 0805   Infiltration Grading ([facility], [facility]) 0 02/14/23 0805   Phlebitis Scale ([facility] Only) 0 02/14/23 0805               MENTAL STATUS ON TRANSFER      Alert and oriented x4, pleasant       CONSULTATIONS  none    PROCEDURES  EEG    LABORATORY  Lab Results   Component Value Date    SODIUM 143 02/13/2023    POTASSIUM 3.7 02/13/2023    CHLORIDE 110 02/13/2023    CO2 20 02/13/2023    GLUCOSE 305 (H) 02/13/2023    BUN 34 (H) 02/13/2023    CREATININE 0.87 02/13/2023        Lab Results   Component Value Date    WBC 9.0 02/14/2023    HEMOGLOBIN 10.8 (L) 02/14/2023    HEMATOCRIT 35.8 (L) 02/14/2023    PLATELETCT 303 02/14/2023        Total time of the discharge process exceeds 37 minutes.

## 2023-02-14 NOTE — ASSESSMENT & PLAN NOTE
On chronic anticoagulation  Vitals:    02/13/23 1552   BP: 139/70   Pulse: (!) 49   Resp: 18   Temp: 36.6 °C (97.9 °F)   SpO2: 96%     HR low, hold BB as per parameters

## 2023-02-14 NOTE — PROGRESS NOTES
Assumed care of pt at 0700. Report received and bedside rounding completed with night RN. Pt is calm no SOB, no acute distress noted.  Call light and pt belongings within reach - hourly rounding in place. See flowsheets for further assessment.   Condom cath replaced per tech.    Fall precautions in place,  bed alarm. - Treaded non slip socks. Bed locked. Communication board updated with POC.  Pt to go down for CT head at 0930 this am.

## 2023-02-14 NOTE — PROGRESS NOTES
"[facility] Medicine Daily Progress Note    Date of Service  2/13/2023    Chief Complaint  Perry Cross is a 82 y.o. male admitted 2/10/2023 with No chief complaint on file.        Hospital Course  No notes on file  Perry Cross is a 82 y.o. male who presented 2/10/2023 with history of atrial fibrillation on chronic anticoagulation with Coumadin presents after being found down at his home.  Patient is normally functional and independent.  He lives alone.  Patient was found to have greater than 3000 CPK with elevated troponin at outlying facility PE.  EKG with atrial fibrillation, rate controlled.  INR of 1.9.  Patient does report hitting his head, however, denies  loss of consciousness.  Urinalysis is pending.     On evaluation, patient was unable to pass a swallow evaluation.  He reports generalized body aches.  He is quite pleasant, and reports falling down the stairs.  He is unclear how the ambulance was contacted.  But doesn't believe he was on the ground for long.  Pt is alert and oriented x 3 on my exam.  However, as per staff, intermittent confusion.  Was unable to recognize his family.  [facility].\"    Dr. Ricketts 2/10/2023    Interval Problem Update  2/11. He is back to his baseline though he has poor insight and memory, he is in a good mood. Family at [facility], I spent time answering their questions reviewing results, medications and tests. Patient also on a dysphagia friendly diet  2/11/2023 Time spent [id number]  2/12. COgnitive defictis but mentation seems baseline  CT head no intracrenial bleed  MRI and EEG pending.  2/13. He is  close to his baseline. COgnitive deficits. He and family agreed to SNF upon discharge. If MRI cannot be obtained today can get a CT head to r/o acute stroke from the prior CT and can discharge.    I have discussed this patient's plan of care and discharge plan at IDT rounds today with Case Management, Nursing, Nursing leadership, " and other members of the IDT team.    Consultants/Specialty      Code Status  Full Code    Disposition  Patient is not medically cleared for discharge.   Anticipate discharge to  TBD .  I have placed the appropriate orders for post-discharge needs.    Review of Systems  Review of Systems   Unable to perform ROS: Mental acuity      Physical Exam  Temp:  [36.3 °C (97.3 °F)-36.9 °C (98.4 °F)] 36.6 °C (97.9 °F)  Pulse:  [49-72] 49  Resp:  [18] 18  BP: (137-152)/() 139/70  SpO2:  [91 %-96 %] 96 %    Physical Exam  Vitals and nursing note reviewed.   Constitutional:       Comments: Elderly, thin   HENT:      Head: Normocephalic and atraumatic.      Right Ear: External ear normal.      Left Ear: External ear normal.      Nose: Nose normal.      Mouth/Throat:      Mouth: Mucous membranes are moist.   Eyes:      General: No scleral icterus.     Conjunctiva/sclera: Conjunctivae normal.   Cardiovascular:      Rate and Rhythm: Normal rate and regular rhythm.      Heart sounds: No murmur heard.    No friction rub. No gallop.   Pulmonary:      Effort: Pulmonary effort is normal.      Breath sounds: Normal breath sounds.   Abdominal:      General: Abdomen is flat. Bowel sounds are normal. There is no distension.      Palpations: Abdomen is soft.      Tenderness: There is no abdominal tenderness. There is no guarding.   Musculoskeletal:         General: Normal range of motion.      Cervical back: Normal range of motion and neck supple.   Skin:     General: Skin is warm.   Neurological:      Mental Status: He is alert and oriented to person, place, and time. Mental status is at baseline.      Motor: Weakness present.      Comments: Memorry issues  Cognitive deficits   Psychiatric:         Mood and Affect: Mood normal.         Behavior: Behavior normal.         Thought Content: Thought content normal.         Judgment: Judgment normal.      Comments: More pleasant today       Fluids    Intake/Output Summary (Last 24 hours) at  "2/13/2023 1718  Last data filed at 2/13/2023 1344  Gross per 24 hour   Intake 1430 ml   Output 2700 ml   Net -1270 ml         Laboratory  Recent Labs     02/11/23  0514 02/12/23  0415 02/13/23  0252   WBC 17.2* 14.3* 10.1   RBC 4.69* 4.47* 4.47*   HEMOGLOBIN 10.8* 10.3* 10.3*   HEMATOCRIT 36.8* 34.6* 35.5*   MCV 78.5* 77.4* 79.4*   MCH 23.0* 23.0* 23.0*   MCHC 29.3* 29.8* 29.0*   RDW 59.2* 57.8* 58.9*   PLATELETCT 279 285 298   MPV 9.8 10.0 10.8       Recent Labs     02/12/23  0415 02/12/23  1613 02/13/23  0252   SODIUM 149* 144 145   POTASSIUM 3.4* 3.3* 3.1*   CHLORIDE 115* 111 111   CO2 20 19* 23   GLUCOSE 187* 253* 204*   BUN 38* 38* 34*   CREATININE 0.80 0.94 0.92   CALCIUM 9.4 9.2 9.0       Recent Labs     02/11/23  0514 02/12/23  0415 02/13/23  0252   INR 1.80* 2.41* 2.84*           Recent Labs     02/11/23  0514   TRIGLYCERIDE 155*   HDL 58   LDL 62         Imaging  OUTSIDE IMAGES-CT CERVICAL SPINE   Final Result      OUTSIDE IMAGES-DX LOWER EXTREMITY, RIGHT   Final Result      OUTSIDE IMAGES-DX CHEST   Final Result      OUTSIDE IMAGES-CT HEAD   Final Result      NM-CARDIAC STRESS TEST   Final Result      CT-HEAD W/O   Final Result      1.  No evidence of acute intracranial process.      2.  Cerebral atrophy as well as periventricular chronic small vessel ischemic change.         EC-ECHOCARDIOGRAM COMPLETE W/O CONT   Final Result      MR-BRAIN-W/O    (Results Pending)          Assessment/Plan  * Rhabdomyolysis, hypernatremia/dehydration, UTI, elevated troponin, h/o mild CHF and mod pul HTN  Assessment & Plan  Patient will be admitted to the telemetry telemetry inpatient unit  CPK of 3900  Continue IV fluid hydration  Patient was found down  He does report hitting his head and loss of consciousness, no focal deficits  -Continue neurochecks  Consider further neurologic work-up if worsening neurologic symptoms  PT/OT/SLP evaluation  Monitor renal function\"    Gentle IV hydration  1-2 glasses free water per " "meal  Restart diuretics and spironolactone  Trend Na.  Na normalized. Advised to continue drinking pure water    History of atrial fibrillation  Assessment & Plan  On chronic anticoagulation  Vitals:    02/13/23 1552   BP: 139/70   Pulse: (!) 49   Resp: 18   Temp: 36.6 °C (97.9 °F)   SpO2: 96%     HR low, hold BB as per parameters    Chronic anticoagulation  Assessment & Plan  COntinue    Chronic systolic (congestive) heart failure (HCC)  Assessment & Plan  Moderate pul HTN  Mild on echo  Lasix, BB, ARB, spironolactone    Encephalopathy  Assessment & Plan  Patient with intermittent confusion, lives alone and independent prior to this episode  Continue neurochecks  Geriatrics consult  Rule out infection  UA pending\"    Seems to be back to baseline  U/A show evidence of UTI thus Abx  Reviewed head CT results with family-normal    Leukocytosis  Assessment & Plan  Leukocytosis, UA pending  We will empirically cover with Rocephin  Follow-up procalcitonin\"    U/A some evidence if UTI  Continue antibiotics    Dysphagia  Assessment & Plan  Failed swallow evaluation  N.p.o. for SLP evaluation  Continue IV fluids\"    Now on pureed liquids but if tolerating do clear liquid         VTE prophylaxis: therapeutic anticoagulation with warfarin    I have performed a physical exam and reviewed and updated ROS and Plan today (2/13/2023). In review of yesterday's note (2/12/2023), there are no changes except as documented above.        "

## 2023-02-14 NOTE — THERAPY
"Speech Language Pathology  Daily Treatment     Patient Name: Perry Cross  Age:  82 y.o., Sex:  male  Medical Record #: [id number]  Today's Date: 2/14/2023     Precautions  Precautions: Fall Risk, Swallow Precautions ( See Comments)    Assessment    The patient was seen on this date for a dysphagia treatment with trials of regular textures and thin liquids. Patients mentation is significantly improved. He consumed sequential sips of thin liquids without any overt s/sx of aspiration. He consumed solids with complete and functional mastication. Patient denies difficulty and denies globus sensation. Will upgrade pt to a regular texture diet with thin liquids.     Recommendations  1.  Regular/Thins  2.  Instrumentation: Instrumental swallow study pending clinical progress  3.  Swallowing Instructions & Precautions:   Supervision: Direct supervision during meals  Positioning: Fully upright and midline during oral intake  Medication: Whole with puree, Crush with applesauce or puree, as appropriate, As tolerated  Strategies: Small bites/sips  Oral Care: Q8h  Plan    Continue current treatment plan.    Discharge Recommendations: Anticipate that the patient will have no further speech therapy needs after discharge from the hospital    Objective       02/14/23 1116   Precautions   Precautions Fall Risk;Swallow Precautions ( See Comments)   Vitals   O2 Delivery Device None - Room Air   Pain 0 - 10 Group   Therapist Pain Assessment Post Activity Pain Same as Prior to Activity;Nurse Notified;0   Recommended Route of Medication Administration   Medication Administration  Float Whole with Puree;Crush all Medications in Puree   Patient / Family Goals   Patient / Family Goal #1 \"Goes down smooth\"   Goal #1 Outcome Progressing as expected   Short Term Goals   Short Term Goal # 1 Pt will consume PU4/MT2 with direct supervision from nursing staff and adherence to posted swallow precautions with no overt s/sx concerning for aspiration. "   Goal Outcome # 1 Progressing as expected   Education Group   Education Provided Dysphagia;Role of Speech Therapy   Dysphagia Patient Response Patient;Acceptance;Explanation;Demonstration;Verbal Demonstration;Action Demonstration;Reinforcement Needed   Role of SLP Patient Response Patient;Acceptance;Explanation;Demonstration;Verbal Demonstration;Action Demonstration;Reinforcement Needed   Anticipated Discharge Needs   Discharge Recommendations Anticipate that the patient will have no further speech therapy needs after discharge from the hospital   Therapy Recommendations Upon DC Dysphagia Training

## 2023-02-14 NOTE — CARE PLAN
The patient is Stable - Low risk of patient condition declining or worsening    Shift Goals  Clinical Goals: Encourage Fluids, Prepare for MRI  Patient Goals: DC to SNF  Family Goals: ABE    Progress made toward(s) clinical / shift goals:        Problem: Skin Integrity  Goal: Skin integrity is maintained or improved. Patient's skin is clean and dry, uses call light appropriately for staff to assist with cleansing after BM's.   Outcome: Progressing     Problem: Fall Risk  Goal: Patient will remain free from falls. Uses call light appropriately and understands the risks of trying to get out of bed alone.  Outcome: Progressing     Problem: Hemodynamics  Goal: Patient's hemodynamics, fluid balance and neurologic status will be stable or improve. Patient's LR was Dc'd today and is drinking fluids appropriately to maintain fluid balance.  Outcome: Progressing       Patient is not progressing towards the following goals:

## 2023-02-14 NOTE — PROGRESS NOTES
Monitor Summary:     Rhythm: Afib  Rate: 47-68  Ectopy: (F) PVC's, Trigem, Coup  Measurements: -/.08/-           12 Hour Chart Check

## 2023-02-16 NOTE — DOCUMENTATION QUERY
[facility]                                                                       Query Response Note      PATIENT:               KAJAL HOLLAND  ACCT #:                  [id number]  MRN:                     [id number]  :                      1940  ADMIT DATE:       2/10/2023 1:59 PM  DISCH DATE:        2023 12:33 PM  RESPONDING  PROVIDER #:        [id number]           QUERY TEXT:    Documentation in the medical record indicates that this patient has been diagnosed as having a myocardial infarction.? Presents with elevated troponin, EKG showing atrial fibrillation and elevated CPK, rhabdomyolysis. Transferred from outside facility. Underwent stress test on 2/10, coumadin continued.        Can the type of myocardial infarction be further clarified based on the above clinical indicators?                                                                                                                                        The patient's Clinical Indicators include:  Findings: 2/10 H&P: Troponin 61, 71 Nstemi     2/10 PN: He is transferred for rhabdomyolysis, NSTEMI with troponin of 316    Treatment: 2/10 H&P: stress test in AM, continue coumadin, cardiology evaluation as needed     Risk Factors: 2/10 H&P: EKG with atrial fibrillation, chronic, found down in his home CPK 3000    2/10 PN: rhabdomyolysis      Charis Reynolds RN BSN  Clinical Documentation   [email or web address]  Connect via [employer] Messenger  Options provided:   -- Type 1 (Acute STEMI or Acute NSTEMI due to CAD)   -- Type 2 MI Demand Ischemia due to **(e.g., blood loss, takotsubo cardiomyopathy, hypotension, shock) {can be documented as a supply-demand mismatch}**   -- NSTEMI ruled out   -- Other explanation, (please specify other explanation)   -- Unable to determine      Query created by: Charis Garcia on 2023 3:03 AM    RESPONSE TEXT:    Type 2 MI Demand  Ischemia due to **(e.g., blood loss, takotsubo cardiomyopathy, hypotension, shock) {can be documented as a supply-demand mismatch}**          Electronically signed by:  ELISE HALL MD 2/15/2023 11:32 PM